# Patient Record
Sex: FEMALE | Race: ASIAN | URBAN - METROPOLITAN AREA
[De-identification: names, ages, dates, MRNs, and addresses within clinical notes are randomized per-mention and may not be internally consistent; named-entity substitution may affect disease eponyms.]

---

## 2017-03-29 ENCOUNTER — TRANSFERRED RECORDS (OUTPATIENT)
Dept: HEALTH INFORMATION MANAGEMENT | Facility: CLINIC | Age: 27
End: 2017-03-29

## 2017-03-29 LAB
HPV ABSTRACT: NORMAL
PAP-ABSTRACT: ABNORMAL

## 2017-07-11 ENCOUNTER — PRENATAL OFFICE VISIT (OUTPATIENT)
Dept: NURSING | Facility: CLINIC | Age: 27
End: 2017-07-11
Payer: COMMERCIAL

## 2017-07-11 VITALS
DIASTOLIC BLOOD PRESSURE: 79 MMHG | WEIGHT: 170 LBS | TEMPERATURE: 98.7 F | HEART RATE: 91 BPM | HEIGHT: 66 IN | SYSTOLIC BLOOD PRESSURE: 123 MMHG | BODY MASS INDEX: 27.32 KG/M2

## 2017-07-11 DIAGNOSIS — Z34.00 SUPERVISION OF NORMAL FIRST PREGNANCY: Primary | ICD-10-CM

## 2017-07-11 PROBLEM — Z23 NEED FOR TDAP VACCINATION: Status: ACTIVE | Noted: 2017-07-11

## 2017-07-11 LAB
ABO + RH BLD: NORMAL
ABO + RH BLD: NORMAL
BLD GP AB SCN SERPL QL: NORMAL
BLOOD BANK CMNT PATIENT-IMP: NORMAL
ERYTHROCYTE [DISTWIDTH] IN BLOOD BY AUTOMATED COUNT: 13.9 % (ref 10–15)
HCT VFR BLD AUTO: 42.6 % (ref 35–47)
HGB BLD-MCNC: 14.2 G/DL (ref 11.7–15.7)
MCH RBC QN AUTO: 30.8 PG (ref 26.5–33)
MCHC RBC AUTO-ENTMCNC: 33.3 G/DL (ref 31.5–36.5)
MCV RBC AUTO: 92 FL (ref 78–100)
PLATELET # BLD AUTO: 209 10E9/L (ref 150–450)
RBC # BLD AUTO: 4.61 10E12/L (ref 3.8–5.2)
SPECIMEN EXP DATE BLD: NORMAL
WBC # BLD AUTO: 9.2 10E9/L (ref 4–11)

## 2017-07-11 PROCEDURE — 99207 ZZC NO CHARGE NURSE ONLY: CPT

## 2017-07-11 PROCEDURE — 86780 TREPONEMA PALLIDUM: CPT | Performed by: OBSTETRICS & GYNECOLOGY

## 2017-07-11 PROCEDURE — 85027 COMPLETE CBC AUTOMATED: CPT | Performed by: OBSTETRICS & GYNECOLOGY

## 2017-07-11 PROCEDURE — 86901 BLOOD TYPING SEROLOGIC RH(D): CPT | Performed by: OBSTETRICS & GYNECOLOGY

## 2017-07-11 PROCEDURE — 99000 SPECIMEN HANDLING OFFICE-LAB: CPT | Performed by: OBSTETRICS & GYNECOLOGY

## 2017-07-11 PROCEDURE — 36415 COLL VENOUS BLD VENIPUNCTURE: CPT | Performed by: OBSTETRICS & GYNECOLOGY

## 2017-07-11 PROCEDURE — 86900 BLOOD TYPING SEROLOGIC ABO: CPT | Performed by: OBSTETRICS & GYNECOLOGY

## 2017-07-11 PROCEDURE — 83021 HEMOGLOBIN CHROMOTOGRAPHY: CPT | Mod: 90 | Performed by: OBSTETRICS & GYNECOLOGY

## 2017-07-11 PROCEDURE — 86762 RUBELLA ANTIBODY: CPT | Performed by: OBSTETRICS & GYNECOLOGY

## 2017-07-11 PROCEDURE — 87389 HIV-1 AG W/HIV-1&-2 AB AG IA: CPT | Performed by: OBSTETRICS & GYNECOLOGY

## 2017-07-11 PROCEDURE — 87340 HEPATITIS B SURFACE AG IA: CPT | Performed by: OBSTETRICS & GYNECOLOGY

## 2017-07-11 PROCEDURE — 86850 RBC ANTIBODY SCREEN: CPT | Performed by: OBSTETRICS & GYNECOLOGY

## 2017-07-11 RX ORDER — PRENATAL VIT/IRON FUM/FOLIC AC 27MG-0.8MG
1 TABLET ORAL DAILY
Qty: 100 TABLET | Refills: 3 | Status: SHIPPED | OUTPATIENT
Start: 2017-07-11

## 2017-07-11 NOTE — NURSING NOTE
"Chief Complaint   Patient presents with     Prenatal Care     new  teaching and labs       Initial /79  Pulse 91  Temp 98.7  F (37.1  C)  Ht 5' 6\" (1.676 m)  Wt 170 lb (77.1 kg)  LMP 05/30/2017  BMI 27.44 kg/m2 Estimated body mass index is 27.44 kg/(m^2) as calculated from the following:    Height as of this encounter: 5' 6\" (1.676 m).    Weight as of this encounter: 170 lb (77.1 kg).  Medication Reconciliation: complete    "

## 2017-07-11 NOTE — MR AVS SNAPSHOT
After Visit Summary   7/11/2017    Evangelina Fletcher    MRN: [id number]           Patient Information     Date Of Birth          1990        Visit Information        Provider Department      7/11/2017 1:30 PM MULTILINGUAL WORD; RD OB NURSE EDUCATION [facility]        Today's Diagnoses     Supervision of normal first pregnancy    -  1       Follow-ups after your visit        Your next 10 appointments already scheduled     Aug 16, 2017  3:30 PM CDT   New Prenatal with Casi Dowling MD   [facility] ([facility]    [address] [place] [id number] [phone]              Who to contact     If you have questions or need follow up information about today's clinic visit or your schedule please contact [facility] [place] at [phone].  Normal or non-critical lab and imaging results will be communicated to you by MyChart, letter or phone within 4 business days after the clinic has received the results. If you do not hear from us within 7 days, please contact the clinic through [employer] or phone. If you have a critical or abnormal lab result, we will notify you by phone as soon as possible.  Submit refill requests through [employer] or call your pharmacy and they will forward the refill request to us. Please allow 3 business days for your refill to be completed.          Additional Information About Your Visit        MyChart Information     [employer] gives you secure access to your electronic health record. If you see a primary care provider, you can also send messages to your care team and make appointments. If you have questions, please call your primary care clinic.  If you do not have a primary care provider, please call [phone] and they will assist you.        Care EveryWhere ID     This is your Care EveryWhere ID. This could be used by other organizations to access your [facility]  "records  [id number]        Your Vitals Were     Pulse Temperature Height Last Period BMI (Body Mass Index)       91 98.7  F (37.1  C) 5' 6\" (1.676 m) 05/30/2017 27.44 kg/m2        Blood Pressure from Last 3 Encounters:   07/11/17 123/79    Weight from Last 3 Encounters:   07/11/17 170 lb (77.1 kg)              We Performed the Following     ABO/RH Type and Screen     Anti Treponema     CBC with Platelets     Hepatitis B surface antigen     HGB Eval Reflex to ELP or RBC Solubility     HIV Antigen Antibody Combo     Rubella Antibody IgG Quantitative     UA without Microscopic     Urine Culture Aerobic Bacterial          Today's Medication Changes          These changes are accurate as of: 7/11/17  2:28 PM.  If you have any questions, ask your nurse or doctor.               Start taking these medicines.        Dose/Directions    prenatal multivitamin  plus iron 27-0.8 MG Tabs per tablet   Used for:  Supervision of normal first pregnancy        Dose:  1 tablet   Take 1 tablet by mouth daily   Quantity:  100 tablet   Refills:  3            Where to get your medicines      These medications were sent to [facility] [place], [place] - [address] [facility]  [address], [place] [address]     Phone:  [phone]     prenatal multivitamin  plus iron 27-0.8 MG Tabs per tablet                Primary Care Provider    None Specified       No primary provider on file.        Equal Access to Services     CRISTIANE TAYLOR AH: Rg Bee, waaxda luqadaha, qaybta kaalmada adeegyada, angela zepeda. So [facility] [phone].    ATENCIÓN: Si habla español, tiene a ludwig disposición servicios gratuitos de asistencia lingüística. Llame al [phone].    We comply with applicable federal civil rights laws and [place] laws. We do not discriminate on the basis of race, color, national origin, age, disability sex, sexual orientation or gender identity.            Thank you!     Thank you for " choosing [facility]  for your care. Our goal is always to provide you with excellent care. Hearing back from our patients is one way we can continue to improve our services. Please take a few minutes to complete the written survey that you may receive in the mail after your visit with us. Thank you!             Your Updated Medication List - Protect others around you: Learn how to safely use, store and throw away your medicines at www.disposemymeds.org.          This list is accurate as of: 7/11/17  2:28 PM.  Always use your most recent med list.                   Brand Name Dispense Instructions for use Diagnosis    prenatal multivitamin  plus iron 27-0.8 MG Tabs per tablet     100 tablet    Take 1 tablet by mouth daily    Supervision of normal first pregnancy

## 2017-07-11 NOTE — PROGRESS NOTES
Patient presents for new ob teaching and labs, first pregnancy. Discussed first trimester screening. Handouts reviewed and given. Denies any problems at this time. Has appointment with Dr Dowling 8/16/17    Caffeine intake/servings daily - 1 or 2 tea  Calcium intake/servings daily - 3  Exercise 0 times weekly - describe ; encouraged walking  Sunscreen used - Yes  Seatbelts used - Yes  Guns stored in the home - No  Self Breast Exam - No  Pap test up to date -  Yes  Eye exam up to date -  Yes  Dental exam up to date -  No  Immunizations reviewed and up to date - Yes  Abuse: Current or Past (Physical, Sexual or Emotional) - No  Do you feel safe in your environment - Yes  Do you cope well with stress - Yes  Do you suffer from insomnia - No    Prenatal OB Questionnaire  Past Medical History  Diabetes   No  Hypertension   No  Heart Disease, mitral valve prolapse, or rheumatic fever?   No  An autoimmune disorder such as Lupus or Rheumatoid Arthritis?   No  Kidney Disease or Urinary Tract Infection?   No  Epilepsy, seizures or spells?   No  Migraine headaches?   No  A stroke or loss of function or sensation?   No  Any other neurological problems?   No  Have you ever been treated for depression?  No  Are you having problems with crying spells or loss of self-esteem?   No  Have you ever required psychiatric care?   No  Have you ever hepatitis, liver disease or jaundice?   No  Have you ever been treated for blood clots in your veins, deep venous thrombosis, inflammation in the veins, thrombosis, phlebitis, pulmonary embolism or varicosities?   No  Have you had excessive bleeding after surgery or dental work?   No  Do you bleed more than other women after a cut or scratch?   No  Do you have a history of anemia?   No  Have you ever been treated for thyroid problems or taken thyroid medication?  No  Do you have any other endocrine problems?  No  Have you ever been in a major accident or suffered serious trauma?   No  Within  the last year, has anyone hit slapped, kicked or otherwise hurt you?  No  In the last year, has anyone forced you to have sex when you didn't want to?  No  Have you ever had a blood transfusion?   No  Would you refuse a blood transfusion if a doctor judged it to be medically necessary?   No  If you answered yes, would you rather die than have a blood transfusion?   No  If you answered yes, is this for Rastafarian reasons?   No  Does anyone in your home smoke?   No  Do you use tobacco products?  No  Do you drink beer, wine, hard liquor?  No  Do you use any of the following: marijuana, speed, cocaine, heroine, hallucinogens, or other drugs?  No  Is your blood type Rh negative?   No  Have you ever had abnormal antibodies in your blood?   No  Have you ever had asthma?   No  Have you ever had tuberculosis?   No  Do you have any allergies to drugs or over-the-counter medications?   No    Allergies as of 7/11/2017:    Allergies as of 07/11/2017     (No Known Allergies)       Do you have any breast problems?   No  Have you ever ?   No  Have you had any gynecological surgical procedures such as cervical conization, a LEEP procedure, laser treatment, cryosurgery of the cervix, or a dilation and curettage, etc?  No  Have you had any other surgical procedures?  No  Have you been hospitalized for a nonsurgical reason excluding normal delivery?   No  Have you ever had any anesthetic complications?   No  Have you ever had an abnormal pap smear?   No  Do you have a history of abnormalities of the uterus?   No  Did it take you more than one year to become pregnant?   No  Have you ever been evaluated or treated for infertility?   No  Is there a history of medical problems in your family, which you feel might adversely affect your health or pregnancy?   No  Do you have any other problems we have not asked you about which you feel may be important to this pregnancy?  No    Symptoms since Last Menstrual Period  Do you have any of  the following:    *abdominal pain  No  *blood in stool or urine  No  *chest pain  No  *shortness of breath  No  *coughing or vomiting up blood No  *heart racing or skipping beats  No  *nausea and vomiting  No  *pain with urination  No  *vaginal discharge or bleeding  No  Current medications are:  Current Outpatient Prescriptions   Medication Sig Dispense Refill     Prenatal Vit-Fe Fumarate-FA (PRENATAL MULTIVITAMIN  PLUS IRON) 27-0.8 MG TABS per tablet Take 1 tablet by mouth daily 100 tablet 3       Genetic Screening  At the time of birth, will you be 35 years old or older?  No  Has the patient, baby s father, or anyone in either family had:  Thalassemia (Italian, Greek, Mediterranean, or  background only) and an MCV result less than 80?  No  Neural tube defect such as meningomyelocele, spina bifida or anencephaly?  No  Congenital heart defect?  No  Down s syndrome?  No  Berry-Sach s disease (Confucianist, Cajun, Pashto-Somali)?  No  Sickle cell disease or trait (Tea)?  No  Hemophilia or other inherited problems of blood coagulation? No  Muscular dystrophy?  No  Cystic Fibrosis?  No  [place] s chorea?  No  Mental retardation/autism? No   If yes, was the person tested for fragile X?  No  Any other inherited genetic or chromosomal disorder?  No  Maternal metabolic disorder (e.g. insulin-dependent diabetes, PKU)? No  A child with birth defects not listed above?  No  Recurrent pregnancy loss or a stillbirth?  No  Has the patient had any medications/street drugs/alcohol since her last menstrual period? No  Does the patient or baby s father have any other genetic risks?  No  Infection History  Do you object to being tested for Hepatitis B? No  Do you object to being tested for HIV? No  Do you feel that you are at high risk for coming in contact with the AIDS virus?  No  Have you ever been treated for tuberculosis?  No  Have you ever received the BCG vaccine for tuberculosis?  No  Have you ever had a positive skin  test for tuberculosis? No  Do you live with someone who has tuberculosis?  No  Have you ever been exposed to tuberculosis?  No  Do you have genital herpes?  No  Does your partner have genital herpes?  No  Have you had a rash or viral illness since your last period?  No  Have you ever had Gonorrhea, Chlamydia, Syphilis, venereal warts, trichomoniasis, pelvic inflammatory disease or any other sexually transmitted disease?  No  Do you know if you are a genital group B streptococcus carrier? No  You have not had chicken pox/varicella  Yes  Have you been vaccinated against chicken pox?  No  Have you had any other infectious disease? No        Early ultrasound screening tool:    Does patient have irregular periods?  No  Did patient use hormonal birth control in the three months prior to positive urine pregnancy test? No  Is the patient breastfeeding?  No  Is the patient 10 weeks or greater at time of education visit?  No

## 2017-07-12 LAB
HBV SURFACE AG SERPL QL IA: NONREACTIVE
HIV 1+2 AB+HIV1 P24 AG SERPL QL IA: NORMAL
RUBV IGG SERPL IA-ACNC: 126 IU/ML
T PALLIDUM IGG+IGM SER QL: NEGATIVE

## 2017-07-14 LAB
HGB A1 MFR BLD: 96.1 %
HGB A2 MFR BLD: 3.1 %
HGB C MFR BLD: 0 %
HGB E MFR BLD: 0 %
HGB F MFR BLD: 0.8 %
HGB FRACT BLD ELPH-IMP: NORMAL
HGB OTHER MFR BLD: 0 %
HGB S BLD QL SOLY: NORMAL
HGB S MFR BLD: 0 %
PATH INTERP BLD-IMP: NORMAL

## 2017-08-16 ENCOUNTER — PRENATAL OFFICE VISIT (OUTPATIENT)
Dept: OBGYN | Facility: CLINIC | Age: 27
End: 2017-08-16
Payer: COMMERCIAL

## 2017-08-16 VITALS
TEMPERATURE: 97.4 F | BODY MASS INDEX: 28.03 KG/M2 | OXYGEN SATURATION: 100 % | WEIGHT: 174.4 LBS | HEART RATE: 93 BPM | HEIGHT: 66 IN | DIASTOLIC BLOOD PRESSURE: 86 MMHG | SYSTOLIC BLOOD PRESSURE: 122 MMHG

## 2017-08-16 DIAGNOSIS — Z34.01 NORMAL FIRST PREGNANCY CONFIRMED, CURRENTLY IN FIRST TRIMESTER: Primary | ICD-10-CM

## 2017-08-16 DIAGNOSIS — B37.31 YEAST INFECTION OF THE VAGINA: ICD-10-CM

## 2017-08-16 LAB
SPECIMEN SOURCE: ABNORMAL
WET PREP SPEC: ABNORMAL

## 2017-08-16 PROCEDURE — 99207 ZZC FIRST OB VISIT: CPT | Performed by: OBSTETRICS & GYNECOLOGY

## 2017-08-16 PROCEDURE — 87210 SMEAR WET MOUNT SALINE/INK: CPT | Performed by: OBSTETRICS & GYNECOLOGY

## 2017-08-16 NOTE — MR AVS SNAPSHOT
After Visit Summary   8/16/2017    Henrik Fletcher    MRN: [id number]           Patient Information     Date Of Birth          1990        Visit Information        Provider Department      8/16/2017 3:15 PM Casi Dowling MD; MULTILINGUAL WORD [facility]        Today's Diagnoses     Normal first pregnancy confirmed, currently in first trimester    -  1    Yeast infection of the vagina           Follow-ups after your visit        Your next 10 appointments already scheduled     Sep 13, 2017  4:45 PM CDT   ESTABLISHED PRENATAL with Casi Dowling MD   [facility] ([facility])    [address] [place] [id number] [phone]              Who to contact     If you have questions or need follow up information about today's clinic visit or your schedule please contact [facility] directly at [phone].  Normal or non-critical lab and imaging results will be communicated to you by MyChart, letter or phone within 4 business days after the clinic has received the results. If you do not hear from us within 7 days, please contact the clinic through [employer] or phone. If you have a critical or abnormal lab result, we will notify you by phone as soon as possible.  Submit refill requests through [employer] or call your pharmacy and they will forward the refill request to us. Please allow 3 business days for your refill to be completed.          Additional Information About Your Visit        MyChart Information     [employer] gives you secure access to your electronic health record. If you see a primary care provider, you can also send messages to your care team and make appointments. If you have questions, please call your primary care clinic.  If you do not have a primary care provider, please call [phone] and they will assist you.        Care EveryWhere ID     This is your Care  "EveryWhere ID. This could be used by other organizations to access your [facility] medical records  [id number]        Your Vitals Were     Pulse Temperature Height Last Period Pulse Oximetry BMI (Body Mass Index)    93 97.4  F (36.3  C) (Oral) 5' 6\" (1.676 m) 05/30/2017 100% 28.15 kg/m2       Blood Pressure from Last 3 Encounters:   08/16/17 122/86   07/11/17 123/79    Weight from Last 3 Encounters:   08/16/17 174 lb 6.4 oz (79.1 kg)   07/11/17 170 lb (77.1 kg)              We Performed the Following     Wet prep          Today's Medication Changes          These changes are accurate as of: 8/16/17  7:12 PM.  If you have any questions, ask your nurse or doctor.               Start taking these medicines.        Dose/Directions    miconazole nitrate 4 % Crea   Commonly known as:  MONISTAT 3   Used for:  Normal first pregnancy confirmed, currently in first trimester, Yeast infection of the vagina   Started by:  Casi Dowling MD        Dose:  1 Applicatorful   Place 1 Applicatorful vaginally At Bedtime for 3 days   Quantity:  25 g   Refills:  1            Where to get your medicines      These medications were sent to [facility] [place], [place] - [address] [facility]  [address], [place] [address]     Phone:  [phone]     miconazole nitrate 4 % Crea                Primary Care Provider    None Specified       No primary provider on file.        Equal Access to Services     [facility] Hospital[facility]AS[facility] AH: Hadii rustam Bee, waaxda luqadaha, qaybta kaalmada noahyada, angela zepeda. So [facility] [phone].    ATENCIÓN: Si habla español, tiene a ludwig disposición servicios gratuitos de asistencia lingüística. Jaret al [phone].    We comply with applicable federal civil rights laws and [place] laws. We do not discriminate on the basis of race, color, national origin, age, disability sex, sexual orientation or gender identity.            Thank you!     Thank you for choosing " [facility]  for your care. Our goal is always to provide you with excellent care. Hearing back from our patients is one way we can continue to improve our services. Please take a few minutes to complete the written survey that you may receive in the mail after your visit with us. Thank you!             Your Updated Medication List - Protect others around you: Learn how to safely use, store and throw away your medicines at www.disposemymeds.org.          This list is accurate as of: 8/16/17  7:12 PM.  Always use your most recent med list.                   Brand Name Dispense Instructions for use Diagnosis    miconazole nitrate 4 % Crea    MONISTAT 3    25 g    Place 1 Applicatorful vaginally At Bedtime for 3 days    Normal first pregnancy confirmed, currently in first trimester, Yeast infection of the vagina       prenatal multivitamin plus iron 27-0.8 MG Tabs per tablet     100 tablet    Take 1 tablet by mouth daily    Supervision of normal first pregnancy

## 2017-08-16 NOTE — PROGRESS NOTES
"Chief Complaint   Patient presents with     Prenatal Care     11+1.       Initial /77 (BP Location: Left arm, Patient Position: Sitting, Cuff Size: Adult Regular)  Pulse 93  Temp 97.4  F (36.3  C) (Oral)  Ht 5' 6\" (1.676 m)  Wt 174 lb 6.4 oz (79.1 kg)  LMP 2017  SpO2 100%  BMI 28.15 kg/m2 Estimated body mass index is 28.15 kg/(m^2) as calculated from the following:    Height as of this encounter: 5' 6\" (1.676 m).    Weight as of this encounter: 174 lb 6.4 oz (79.1 kg).  BP completed using cuff size: regular        The following HM Due: NONE      The following patient reported/Care Every where data was sent to:  P ABSTRACT QUALITY INITIATIVES [68410]  Pap smear done on this date: 3/29/2017 (approximately), by this group: [facility], results were NIL, and HPV negative.      n/a and patient has appointment for today             "

## 2017-08-16 NOTE — Clinical Note
Pap smear done on this date: 3/29/2017 (approximately), by this group: [facility], results were NIL, and HPV negative.

## 2017-08-16 NOTE — PROGRESS NOTES
"SUBJECTIVE:     Henrik Fletcher \"Evangelina\"  is a  27 year old female   @ 11w1d  by LMP who presents for a new Ob visit.  Here with her  and her interpretor.  She is from [place].  Has been here for 2-3 yrs now.  Came here to be with her .  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 She does understand English pretty well.   symptoms of pregnancy include very hungry and nauseated if she does not eat.  Has gained ~ 4 lbs so far.   No bleeding or constipation.   No US so far.        She had a pap smear in 2017 at [facility].   It was ASCUS but negative HPV.  This is in care everywhere.     Patient Active Problem List   Diagnosis     Need for Tdap vaccination       Past Medical History:   Diagnosis Date     NO ACTIVE PROBLEMS        Past Surgical History:   Procedure Laterality Date     NO HISTORY OF SURGERY          Current Outpatient Prescriptions   Medication     Prenatal Vit-Fe Fumarate-FA (PRENATAL MULTIVITAMIN  PLUS IRON) 27-0.8 MG TABS per tablet     No current facility-administered medications for this visit.        No Known Allergies      EXAM:  /86 (BP Location: Right arm, Patient Position: Sitting, Cuff Size: Adult Regular)  Pulse 93  Temp 97.4  F (36.3  C) (Oral)  Ht 5' 6\" (1.676 m)  Wt 174 lb 6.4 oz (79.1 kg)  LMP 2017  SpO2 100%  BMI 28.15 kg/m2     GENERAL: WDWN Nepali F in NAD  HEENT: no abnormalities  NECK: without thyromegaly or adenopathy  BACK: without CVAT or paraspinal tenderness  CHEST: clear to auscultation  CV: RRR without murmur  BREASTS: no palpable masses or adenopathy, no asymmetry or skin dimpling  ABDOMEN: S, NT, no palpable masses or hepatosplenomegaly  MUSCULOSKELETAL: no obvious abnormalities.  NEUROLOGICAL: normal strength, sensation, mental status  PSYCH: normal affect, appropriate  PELVIC: EG - normal adult female.  BUS - within normal limits.  Vagina - well rugated, erythematous with thick white chunky discharge.  Cervix - " no lesions, no CMT. Patient did not tolerate the BME well at all so uterine size hard to estimate.  Uterus -  nontender.  Adnexae - no masses or tenderness.  RV - deferred.      BEDSIDE U/S: done to confirm dates and viability.     ABD US unable to see CRL clearly so transvaginal US done which showed             single live IUP, normal  gestational sac,  normal cardiac activity and fetal movement,  CRL= 10w4d       ASSESSMENT/ PLAN:  IUP @ 11w1d c/w bedside US today    Encounter Diagnoses   Name Primary?     Normal first pregnancy confirmed, currently in first trimester      Yeast infection of the vagina         Discussed routine prenatal care and first trimester screen testing.    She will check to see if her insurance will cover  the first trimester screen.  She does want it, if it is covered.     Patient was counselled on healthy lifestyle habits and all questions answered.    New Ob labs reviewed today.    Return to Clinic in 4 weeks or sooner if problems arise.    Casi Dowling MD

## 2017-08-18 DIAGNOSIS — Z34.90 SUPERVISION OF NORMAL PREGNANCY: Primary | ICD-10-CM

## 2017-08-28 ENCOUNTER — PRE VISIT (OUTPATIENT)
Dept: MATERNAL FETAL MEDICINE | Facility: CLINIC | Age: 27
End: 2017-08-28

## 2017-08-30 ENCOUNTER — OFFICE VISIT (OUTPATIENT)
Dept: MATERNAL FETAL MEDICINE | Facility: CLINIC | Age: 27
End: 2017-08-30
Attending: OBSTETRICS & GYNECOLOGY
Payer: COMMERCIAL

## 2017-08-30 ENCOUNTER — HOSPITAL ENCOUNTER (OUTPATIENT)
Dept: ULTRASOUND IMAGING | Facility: CLINIC | Age: 27
Discharge: HOME | End: 2017-08-30
Attending: OBSTETRICS & GYNECOLOGY | Admitting: OBSTETRICS & GYNECOLOGY
Payer: COMMERCIAL

## 2017-08-30 DIAGNOSIS — O26.90 PREGNANCY RELATED CONDITION, UNSPECIFIED TRIMESTER: ICD-10-CM

## 2017-08-30 DIAGNOSIS — Z36.9 FIRST TRIMESTER SCREENING: Primary | ICD-10-CM

## 2017-08-30 PROCEDURE — 76813 OB US NUCHAL MEAS 1 GEST: CPT

## 2017-08-30 PROCEDURE — 96040 ZZH GENETIC COUNSELING, EACH 30 MINUTES: CPT | Mod: ZF | Performed by: GENETIC COUNSELOR, MS

## 2017-08-30 PROCEDURE — 36415 COLL VENOUS BLD VENIPUNCTURE: CPT | Performed by: OBSTETRICS & GYNECOLOGY

## 2017-08-30 PROCEDURE — 84704 HCG FREE BETACHAIN TEST: CPT | Performed by: OBSTETRICS & GYNECOLOGY

## 2017-08-30 PROCEDURE — 84163 PAPPA SERUM: CPT | Performed by: OBSTETRICS & GYNECOLOGY

## 2017-08-30 NOTE — MR AVS SNAPSHOT
After Visit Summary   8/30/2017    Henrik Fletcher    MRN: [id number]           Patient Information     Date Of Birth          1990        Visit Information        Provider Department      8/30/2017 11:00 AM Polly Luo [facility] Maternal Fetal Medicine [facility]        Today's Diagnoses     First trimester screening    -  1    Pregnancy related condition, unspecified trimester           Follow-ups after your visit        Your next 10 appointments already scheduled     Sep 13, 2017  4:30 PM CDT   ESTABLISHED PRENATAL with Casi Dowling MD   [facility] ([facility])    [address] [place] [id number] [phone]              Who to contact     If you have questions or need follow up information about today's clinic visit or your schedule please contact [facility] directly at [phone].  Normal or non-critical lab and imaging results will be communicated to you by MyChart, letter or phone within 4 business days after the clinic has received the results. If you do not hear from us within 7 days, please contact the clinic through Brightstormhart or phone. If you have a critical or abnormal lab result, we will notify you by phone as soon as possible.  Submit refill requests through Digital Signal or call your pharmacy and they will forward the refill request to us. Please allow 3 business days for your refill to be completed.          Additional Information About Your Visit        Brightstormhart Information     Digital Signal gives you secure access to your electronic health record. If you see a primary care provider, you can also send messages to your care team and make appointments. If you have questions, please call your primary care clinic.  If you do not have a primary care provider, please call [phone] and they will assist you.        Care EveryWhere ID     This is your Care EveryWhere  ID. This could be used by other organizations to access your [place] medical records  [id number]        Your Vitals Were     Last Period                   05/30/2017            Blood Pressure from Last 3 Encounters:   08/16/17 122/86   07/11/17 123/79    Weight from Last 3 Encounters:   08/16/17 79.1 kg (174 lb 6.4 oz)   07/11/17 77.1 kg (170 lb)              We Performed the Following     First Trimester Screen Biochem Markers     MFM Genetic Counseling        Primary Care Provider    None Specified       No primary provider on file.        Equal Access to Services     CRISTIANE TAYLOR : Hadii rustam fuo Soandres, waaxda luqadaha, qaybta kaalmagato ayon, angela ha . So [facility] [phone].    ATENCIÓN: Si habla español, tiene a ludwig disposición servicios gratuitos de asistencia lingüística. Llame al [phone].    We comply with applicable federal civil rights laws and [place] laws. We do not discriminate on the basis of race, color, national origin, age, disability sex, sexual orientation or gender identity.            Thank you!     Thank you for choosing [facility]  for your care. Our goal is always to provide you with excellent care. Hearing back from our patients is one way we can continue to improve our services. Please take a few minutes to complete the written survey that you may receive in the mail after your visit with us. Thank you!             Your Updated Medication List - Protect others around you: Learn how to safely use, store and throw away your medicines at www.disposemymeds.org.          This list is accurate as of: 8/30/17 12:29 PM.  Always use your most recent med list.                   Brand Name Dispense Instructions for use Diagnosis    prenatal multivitamin plus iron 27-0.8 MG Tabs per tablet     100 tablet    Take 1 tablet by mouth daily    Supervision of normal first pregnancy

## 2017-08-30 NOTE — PROGRESS NOTES
"[facility] Maternal Fetal Medicine [place]  Genetic Counseling Consult    Patient: Henrik Fletcher YOB: 1990   Date of Service: 17      Henrik Fletcher was seen at [facility] for genetic consultation to discuss the options for routine screening for fetal chromosome abnormalities. A [facility]  was present for our discussion.       Impression/Plan:   1.  Henrik \"Evangelina\" had an ultrasound and blood draw for first trimester screening.  Results are expected within 5 days, and will be available in [place].  We will contact her to discuss the results, and a copy will be forwarded to the office of the referring OB provider. Henrik requested a detailed message with results with a [facility]  on her voicemail ([phone]).     2. Maternal serum AFP (single marker screen) is recommended after 15 weeks to screen for open neural tube defects. A quad screen should not be performed.    Pregnancy History:   /Parity:    Age at Delivery: 28 year old  MICHOACANO: 3/6/2018, by Last Menstrual Period  Gestational Age: 13w1d    No significant complications or exposures were reported in the current pregnancy.    Medical History:   Henrik bazan reported medical history is not expected to impact pregnancy management or risks to fetal development.       Family History:   A three-generation pedigree was obtained, and is scanned under the  Media  tab.   The reported family history is negative for multiple miscarriages, stillbirths, birth defects, mental retardation, known genetic conditions, and consanguinity.       Carrier Screening:   The patient reports that she and the father of the pregnancy have  ancestry:      The hemoglobinopathies are a group of genetic blood diseases that occur with increased frequency in individuals of  ancestry and carrier screening for these conditions is available.  " Carrier screening for the hemoglobinopathies includes a CBC with red blood cell indices, a ferritin level, and a quantitative hemoglobin electrophoresis or HPLC.  In addition,  screening in the [facility] includes many of the hemoglobinopathies.    Evangelina had a normal hemoglobin electrophoresis and MCV which significantly decreases her risk to be a hemoglobinopathy carrier.       Expanded carrier screening for mutations in a large panel of genes associated with autosomal recessive conditions including cystic fibrosis, spinal muscular atrophy, and others, is now available.       Risk Assessment for Chromosome Conditions:   We explained that the risk for fetal chromosome abnormalities increases with maternal age. We discussed specific features of common chromosome abnormalities, including Down syndrome, trisomy 13, trisomy 18, and sex chromosome trisomies.      - At age 28 at delivery, the midtrimester risk to have a baby with Down syndrome is 1 in 760.     - At age 28 at delivery, the midtrimester  risk to have a baby with any chromosome abnormality is 1 in 380.          Testing Options:   We discussed the following options:   First trimester screening    First trimester ultrasound with nuchal translucency and nasal bone assessments, maternal plasma hCG, SAYRA-A, and AFP measurement    Screens for fetal trisomy 21, trisomy 13, and trisomy 18    Cannot screen for open neural tube defects; maternal serum AFP after 15 weeks is recommended     Non-invasive Prenatal Testing (NIPT)    Maternal plasma cell-free DNA testing; first trimester ultrasound with nuchal translucency and nasal bone assessment is recommended, when appropriate    Screens for fetal trisomy 21, trisomy 13, trisomy 18, and sex chromosome aneuploidy    Cannot screen for open neural tube defects; maternal serum AFP after 15 weeks is recommended     Genetic Amniocentesis    Invasive procedure typically performed in the second trimester by  which amniotic fluid is obtained for the purpose of chromosome analysis and/or other prenatal genetic analysis    Diagnostic results; >99% sensitivity for fetal chromosome abnormalities    AFAFP measurement tests for open neural tube defects        We reviewed the benefits and limitations of this testing.  Screening tests provide a risk assessment specific to the pregnancy for certain fetal chromosome abnormalities, but cannot definitively diagnose or exclude a fetal chromosome abnormality.  Follow-up genetic counseling and consideration of diagnostic testing is recommended with any abnormal screening result.      It was a pleasure to be [facility]. Face-to-face time of the meeting was 25 minutes.    Polly Luo, [facility]  Certified Genetic Counselor  VM: [phone]

## 2017-08-30 NOTE — MR AVS SNAPSHOT
After Visit Summary   8/30/2017    Henrik Fletcher    MRN: [id number]           Patient Information     Date Of Birth          1990        Visit Information        Provider Department      8/30/2017 12:15 PM Margaret Stoll,  [facility] Maternal Fetal Medicine [facility]        Today's Diagnoses     First trimester screening    -  1       Follow-ups after your visit        Your next 10 appointments already scheduled     Sep 13, 2017  4:30 PM CDT   ESTABLISHED PRENATAL with Casi Dowling MD   [facility] [place] ([facility] [place]    [address] [place] [id number] [phone]              Who to contact     If you have questions or need follow up information about today's clinic visit or your schedule please contact [facility] directly at [phone].  Normal or non-critical lab and imaging results will be communicated to you by Showbuckshart, letter or phone within 4 business days after the clinic has received the results. If you do not hear from us within 7 days, please contact the clinic through Showbuckshart or phone. If you have a critical or abnormal lab result, we will notify you by phone as soon as possible.  Submit refill requests through Servhawk or call your pharmacy and they will forward the refill request to us. Please allow 3 business days for your refill to be completed.          Additional Information About Your Visit        MyChart Information     Servhawk gives you secure access to your electronic health record. If you see a primary care provider, you can also send messages to your care team and make appointments. If you have questions, please call your primary care clinic.  If you do not have a primary care provider, please call [phone] and they will assist you.        Care EveryWhere ID     This is your Care EveryWhere ID. This could be used by other organizations to access  your [place] medical records  [id number]        Your Vitals Were     Last Period                   05/30/2017            Blood Pressure from Last 3 Encounters:   08/16/17 122/86   07/11/17 123/79    Weight from Last 3 Encounters:   08/16/17 79.1 kg (174 lb 6.4 oz)   07/11/17 77.1 kg (170 lb)              Today, you had the following     No orders found for display       Primary Care Provider    None Specified       No primary provider on file.        Equal Access to Services     CRISTIANE TAYLOR : Hadii aad ku hadasho Soomaali, waaxda luqadaha, qaybta kaalmada adeegyada, waxay chastityin renée ha . So [facility] [phone].    ATENCIÓN: Si habla español, tiene a ludwig disposición servicios gratuitos de asistencia lingüística. Llame al [phone].    We comply with applicable federal civil rights laws and [place] laws. We do not discriminate on the basis of race, color, national origin, age, disability sex, sexual orientation or gender identity.            Thank you!     Thank you for choosing [facility]  for your care. Our goal is always to provide you with excellent care. Hearing back from our patients is one way we can continue to improve our services. Please take a few minutes to complete the written survey that you may receive in the mail after your visit with us. Thank you!             Your Updated Medication List - Protect others around you: Learn how to safely use, store and throw away your medicines at www.disposemymeds.org.          This list is accurate as of: 8/30/17  4:46 PM.  Always use your most recent med list.                   Brand Name Dispense Instructions for use Diagnosis    prenatal multivitamin plus iron 27-0.8 MG Tabs per tablet     100 tablet    Take 1 tablet by mouth daily    Supervision of normal first pregnancy

## 2017-08-30 NOTE — PROGRESS NOTES
"Please see \"Imaging\" tab under \"Chart Review\" for details of today's US.    Margaret Stoll, DO    "

## 2017-09-05 ENCOUNTER — TELEPHONE (OUTPATIENT)
Dept: MATERNAL FETAL MEDICINE | Facility: CLINIC | Age: 27
End: 2017-09-05

## 2017-09-05 LAB — LAB SCANNED RESULT: NORMAL

## 2017-09-05 NOTE — TELEPHONE ENCOUNTER
"Spoke with Henrik (\"Evangelina) regarding her first trimester screening results which reduced her risk for Down syndrome from 1:751 to 1:>10,000 and the risk for trisomy 13/18 from 1:1355 to 1:>10,000.   A [employer]  was used for this call.    Polly Luo MS, [facility]  Certified Genetic Counselor          "

## 2017-09-13 ENCOUNTER — PRENATAL OFFICE VISIT (OUTPATIENT)
Dept: OBGYN | Facility: CLINIC | Age: 27
End: 2017-09-13
Payer: COMMERCIAL

## 2017-09-13 VITALS
DIASTOLIC BLOOD PRESSURE: 76 MMHG | TEMPERATURE: 99.1 F | SYSTOLIC BLOOD PRESSURE: 114 MMHG | BODY MASS INDEX: 28.12 KG/M2 | WEIGHT: 175 LBS | HEIGHT: 66 IN | OXYGEN SATURATION: 100 % | HEART RATE: 78 BPM

## 2017-09-13 DIAGNOSIS — N89.8 VAGINAL DISCHARGE: Primary | ICD-10-CM

## 2017-09-13 DIAGNOSIS — N76.0 BV (BACTERIAL VAGINOSIS): ICD-10-CM

## 2017-09-13 DIAGNOSIS — Z34.02 NORMAL FIRST PREGNANCY CONFIRMED, CURRENTLY IN SECOND TRIMESTER: ICD-10-CM

## 2017-09-13 DIAGNOSIS — B96.89 BV (BACTERIAL VAGINOSIS): ICD-10-CM

## 2017-09-13 DIAGNOSIS — B37.31 YEAST INFECTION OF THE VAGINA: ICD-10-CM

## 2017-09-13 LAB
SPECIMEN SOURCE: ABNORMAL
WET PREP SPEC: ABNORMAL

## 2017-09-13 PROCEDURE — 87210 SMEAR WET MOUNT SALINE/INK: CPT | Performed by: OBSTETRICS & GYNECOLOGY

## 2017-09-13 PROCEDURE — 99207 ZZC PRENATAL VISIT: CPT | Performed by: OBSTETRICS & GYNECOLOGY

## 2017-09-13 RX ORDER — FLUCONAZOLE 150 MG/1
150 TABLET ORAL ONCE
Qty: 1 TABLET | Refills: 0 | Status: SHIPPED | OUTPATIENT
Start: 2017-09-13 | End: 2017-09-13

## 2017-09-13 RX ORDER — METRONIDAZOLE 500 MG/1
500 TABLET ORAL 2 TIMES DAILY
Qty: 14 TABLET | Refills: 0 | Status: SHIPPED | OUTPATIENT
Start: 2017-09-13 | End: 2017-10-16

## 2017-09-13 RX ORDER — MICONAZOLE NITRATE 2 %
1 CREAM WITH APPLICATOR VAGINAL AT BEDTIME
Qty: 70 G | Refills: 0 | Status: SHIPPED | OUTPATIENT
Start: 2017-09-13 | End: 2017-09-20

## 2017-09-13 ASSESSMENT — PATIENT HEALTH QUESTIONNAIRE - PHQ9: SUM OF ALL RESPONSES TO PHQ QUESTIONS 1-9: 2

## 2017-09-13 NOTE — MR AVS SNAPSHOT
After Visit Summary   9/13/2017    Henrik Fletcher    MRN: [id number]           Patient Information     Date Of Birth          1990        Visit Information        Provider Department      9/13/2017 4:30 PM Casi Dowling MD; [employer] [employer] [facility]        Today's Diagnoses     Vaginal discharge    -  1    BV (bacterial vaginosis)        Yeast infection of the vagina        Normal first pregnancy confirmed, currently in second trimester           Follow-ups after your visit        Who to contact     If you have questions or need follow up information about today's clinic visit or your schedule please contact [facility] directly at [phone].  Normal or non-critical lab and imaging results will be communicated to you by MyChart, letter or phone within 4 business days after the clinic has received the results. If you do not hear from us within 7 days, please contact the clinic through Lapiohart or phone. If you have a critical or abnormal lab result, we will notify you by phone as soon as possible.  Submit refill requests through Backup Circle or call your pharmacy and they will forward the refill request to us. Please allow 3 business days for your refill to be completed.          Additional Information About Your Visit        MyChart Information     Backup Circle gives you secure access to your electronic health record. If you see a primary care provider, you can also send messages to your care team and make appointments. If you have questions, please call your primary care clinic.  If you do not have a primary care provider, please call [phone] and they will assist you.        Care EveryWhere ID     This is your Care EveryWhere ID. This could be used by other organizations to access your [place] medical records  [id number]        Your Vitals Were     Pulse Temperature Height Last Period Pulse Oximetry BMI (Body Mass Index)     "78 99.1  F (37.3  C) (Oral) 5' 6\" (1.676 m) 05/30/2017 100% 28.25 kg/m2       Blood Pressure from Last 3 Encounters:   09/13/17 114/76   08/16/17 122/86   07/11/17 123/79    Weight from Last 3 Encounters:   09/13/17 175 lb (79.4 kg)   08/16/17 174 lb 6.4 oz (79.1 kg)   07/11/17 170 lb (77.1 kg)              We Performed the Following     Wet prep          Today's Medication Changes          These changes are accurate as of: 9/13/17 11:59 PM.  If you have any questions, ask your nurse or doctor.               Start taking these medicines.        Dose/Directions    fluconazole 150 MG tablet   Commonly known as:  DIFLUCAN   Used for:  Yeast infection of the vagina   Started by:  Casi Dowling MD        Dose:  150 mg   Take 1 tablet (150 mg) by mouth once for 1 dose   Quantity:  1 tablet   Refills:  0       metroNIDAZOLE 500 MG tablet   Commonly known as:  FLAGYL   Used for:  BV (bacterial vaginosis)   Started by:  Casi Dowling MD        Dose:  500 mg   Take 1 tablet (500 mg) by mouth 2 times daily   Quantity:  14 tablet   Refills:  0       miconazole 2 % cream   Commonly known as:  MICATIN   Used for:  Yeast infection of the vagina   Started by:  Casi Dowling MD        Dose:  1 applicator   Place 1 applicator vaginally At Bedtime for 7 days   Quantity:  70 g   Refills:  0            Where to get your medicines      These medications were sent to [facility] [place] [place] - [address] [facility]  [address], [place] [address]     Phone:  [phone]     fluconazole 150 MG tablet    metroNIDAZOLE 500 MG tablet    miconazole 2 % cream                Primary Care Provider    None Specified       No primary provider on file.        Equal Access to Services     VINCE TAYLOR : Rg Bee, marquis isbell, angela markham. Enedina [facility] [phone].    ATENCIÓN: Si habla español, tiene a ludwig disposición servicios " yaritza de asistencia lingüística. Jaret nuñez [phone].    We comply with applicable federal civil rights laws and [place] laws. We do not discriminate on the basis of race, color, national origin, age, disability sex, sexual orientation or gender identity.            Thank you!     Thank you for choosing [facility]  for your care. Our goal is always to provide you with excellent care. Hearing back from our patients is one way we can continue to improve our services. Please take a few minutes to complete the written survey that you may receive in the mail after your visit with us. Thank you!             Your Updated Medication List - Protect others around you: Learn how to safely use, store and throw away your medicines at www.disposemymeds.org.          This list is accurate as of: 9/13/17 11:59 PM.  Always use your most recent med list.                   Brand Name Dispense Instructions for use Diagnosis    fluconazole 150 MG tablet    DIFLUCAN    1 tablet    Take 1 tablet (150 mg) by mouth once for 1 dose    Yeast infection of the vagina       metroNIDAZOLE 500 MG tablet    FLAGYL    14 tablet    Take 1 tablet (500 mg) by mouth 2 times daily    BV (bacterial vaginosis)       miconazole 2 % cream    MICATIN    70 g    Place 1 applicator vaginally At Bedtime for 7 days    Yeast infection of the vagina       prenatal multivitamin plus iron 27-0.8 MG Tabs per tablet     100 tablet    Take 1 tablet by mouth daily    Supervision of normal first pregnancy

## 2017-09-13 NOTE — PROGRESS NOTES
15w1d  No complaints.  Had a normal first trimester screen. No movement felt yet.   Feeling well.  Was treated for yeast vaginitis at NOB visit.  Still with intermittent symptoms of vaginal irritation and itching.   Pelvic:  EG - normal adult female.  BUS - within normal limits.  Vagina - well rugated, chunky yellow discharge.  Cervix -  no CMT.    Wet prep + yeast and clue  A/P: persistent bv and yeast  rx for metronidazole and diflucan.    discussed AFP test and they will check with insurance and call back if insurance will cover.  ANIKET

## 2017-09-14 PROBLEM — Z34.02 NORMAL FIRST PREGNANCY CONFIRMED, CURRENTLY IN SECOND TRIMESTER: Status: ACTIVE | Noted: 2017-09-14

## 2017-09-26 DIAGNOSIS — Z34.02 NORMAL FIRST PREGNANCY CONFIRMED, CURRENTLY IN SECOND TRIMESTER: ICD-10-CM

## 2017-09-26 PROCEDURE — 82105 ALPHA-FETOPROTEIN SERUM: CPT | Mod: 90 | Performed by: OBSTETRICS & GYNECOLOGY

## 2017-09-26 PROCEDURE — 36415 COLL VENOUS BLD VENIPUNCTURE: CPT | Performed by: OBSTETRICS & GYNECOLOGY

## 2017-09-26 PROCEDURE — 99000 SPECIMEN HANDLING OFFICE-LAB: CPT | Performed by: OBSTETRICS & GYNECOLOGY

## 2017-09-29 LAB
# FETUSES US: NORMAL
AFP ADJ MOM AMN: 0.85
AFP SERPL-MCNC: 29 NG/ML
AGE - REPORTED: 29.1 YR
DATING METHOD: NORMAL
DIABETIC AT CONCEPTION: NO
FAMILY MEMBER DISEASES HX: NO
FAMILY MEMBER DISEASES HX: NO
GA METHOD: NORMAL
GA: 17 WEEKS
HX OF HEREDITARY DISORDERS: NO
IDDM PATIENT QL: NO
INTEGRATED SCN PATIENT-IMP: NORMAL
LMP START DATE: NORMAL
PREV HX CHROMOSOME ABNORMALITY: NO
SPECIMEN DRAWN SERPL: NORMAL
TWINS: NORMAL

## 2017-10-16 ENCOUNTER — RADIANT APPOINTMENT (OUTPATIENT)
Dept: ULTRASOUND IMAGING | Facility: CLINIC | Age: 27
End: 2017-10-16
Attending: OBSTETRICS & GYNECOLOGY
Payer: COMMERCIAL

## 2017-10-16 ENCOUNTER — PRENATAL OFFICE VISIT (OUTPATIENT)
Dept: OBGYN | Facility: CLINIC | Age: 27
End: 2017-10-16
Payer: COMMERCIAL

## 2017-10-16 VITALS
SYSTOLIC BLOOD PRESSURE: 119 MMHG | WEIGHT: 176.6 LBS | HEIGHT: 66 IN | TEMPERATURE: 98.5 F | DIASTOLIC BLOOD PRESSURE: 70 MMHG | BODY MASS INDEX: 28.38 KG/M2 | OXYGEN SATURATION: 98 % | HEART RATE: 87 BPM

## 2017-10-16 DIAGNOSIS — Z34.02 NORMAL FIRST PREGNANCY CONFIRMED, CURRENTLY IN SECOND TRIMESTER: ICD-10-CM

## 2017-10-16 DIAGNOSIS — Z34.02 NORMAL FIRST PREGNANCY CONFIRMED, CURRENTLY IN SECOND TRIMESTER: Primary | ICD-10-CM

## 2017-10-16 PROCEDURE — 76805 OB US >/= 14 WKS SNGL FETUS: CPT | Performed by: OBSTETRICS & GYNECOLOGY

## 2017-10-16 PROCEDURE — 99207 ZZC PRENATAL VISIT: CPT | Performed by: OBSTETRICS & GYNECOLOGY

## 2017-10-16 NOTE — MR AVS SNAPSHOT
After Visit Summary   10/16/2017    Henrik Fletcher    MRN: [id number]           Patient Information     Date Of Birth          1990        Visit Information        Provider Department      10/16/2017 4:00 PM Casi Dowling MD; [employer] [employer] [facility]        Today's Diagnoses     Normal first pregnancy confirmed, currently in second trimester    -  1       Follow-ups after your visit        Your next 10 appointments already scheduled     Nov 20, 2017  4:00 PM CST   LAB with RD LAB   [facility] ([facility])    [address] [place] [id number] [phone]           Patient must bring picture ID. Patient should be prepared to give a urine specimen  Please do not eat 10-12 hours before your appointment if you are coming in fasting for labs on lipids, cholesterol, or glucose (sugar). Pregnant women should follow their Care Team instructions. Water with medications is okay. Do not drink coffee or other fluids. If you have concerns about taking  your medications, please ask at office or if scheduling via [place] [employer], send a message by clicking on Secure Messaging, Message Your Care Team.            Nov 20, 2017  4:45 PM CST   ESTABLISHED PRENATAL with Casi Dowling MD   [facility] ([facility] [place] [facility])    [address] [place] [id number] [phone]              Future tests that were ordered for you today     Open Future Orders        Priority Expected Expires Ordered    Glucose tolerance gest screen 1 hour Routine  1/14/2018 10/16/2017    OB hemoglobin Routine  1/14/2018 10/16/2017    Anti Treponema Routine  2/13/2018 10/16/2017            Who to contact     If you have questions or need follow up information about today's clinic visit or your schedule please contact [facility] directly at [phone].  Normal  "or non-critical lab and imaging results will be communicated to you by Dealflow.comhart, letter or phone within 4 business days after the clinic has received the results. If you do not hear from us within 7 days, please contact the clinic through Stackpop or phone. If you have a critical or abnormal lab result, we will notify you by phone as soon as possible.  Submit refill requests through Stackpop or call your pharmacy and they will forward the refill request to us. Please allow 3 business days for your refill to be completed.          Additional Information About Your Visit        Stackpop Information     Stackpop gives you secure access to your electronic health record. If you see a primary care provider, you can also send messages to your care team and make appointments. If you have questions, please call your primary care clinic.  If you do not have a primary care provider, please call [phone] and they will assist you.        Care EveryWhere ID     This is your Care EveryWhere ID. This could be used by other organizations to access your [place] medical records  [id number]        Your Vitals Were     Pulse Temperature Height Last Period Pulse Oximetry BMI (Body Mass Index)    87 98.5  F (36.9  C) (Oral) 5' 6\" (1.676 m) 05/30/2017 98% 28.5 kg/m2       Blood Pressure from Last 3 Encounters:   10/16/17 119/70   09/13/17 114/76   08/16/17 122/86    Weight from Last 3 Encounters:   10/16/17 176 lb 9.6 oz (80.1 kg)   09/13/17 175 lb (79.4 kg)   08/16/17 174 lb 6.4 oz (79.1 kg)                 Today's Medication Changes          These changes are accurate as of: 10/16/17  6:53 PM.  If you have any questions, ask your nurse or doctor.               Stop taking these medicines if you haven't already. Please contact your care team if you have questions.     metroNIDAZOLE 500 MG tablet   Commonly known as:  FLAGYL   Stopped by:  Casi Dowling MD                    Primary Care Provider    None Specified       No " primary provider on file.        Equal Access to Services     [facility] CLAUDIA : Hadii rustam Bee, wamónica isbell, qaangela croft. So [facility] [phone].    ATENCIÓN: Si habla español, tiene a ludwig disposición servicios gratuitos de asistencia lingüística. Llame al [phone].    We comply with applicable federal civil rights laws and [place] laws. We do not discriminate on the basis of race, color, national origin, age, disability, sex, sexual orientation, or gender identity.            Thank you!     Thank you for choosing [facility] [place] [facility]  for your care. Our goal is always to provide you with excellent care. Hearing back from our patients is one way we can continue to improve our services. Please take a few minutes to complete the written survey that you may receive in the mail after your visit with us. Thank you!             Your Updated Medication List - Protect others around you: Learn how to safely use, store and throw away your medicines at www.disposemymeds.org.          This list is accurate as of: 10/16/17  6:53 PM.  Always use your most recent med list.                   Brand Name Dispense Instructions for use Diagnosis    prenatal multivitamin plus iron 27-0.8 MG Tabs per tablet     100 tablet    Take 1 tablet by mouth daily    Supervision of normal first pregnancy

## 2017-10-16 NOTE — PROGRESS NOTES
19w6d   No complaints.  + FM. Normal AFP test.   Vaginal symptoms improved but wants to be checked again to make sure.   Has trouble swallowing her prenatal vitamins.  discussed options for her.   US survey done today.  Looks like low lying placenta.  discussed repeat US in couple months. Normal pelvic exam today without vaginal d/c. ANIKET

## 2017-10-25 ENCOUNTER — TELEPHONE (OUTPATIENT)
Dept: OBGYN | Facility: CLINIC | Age: 27
End: 2017-10-25

## 2017-10-25 NOTE — TELEPHONE ENCOUNTER
"Pt's  calling in with pt in the room, stating that the pt is having pain in her lower abdomen that comes and goes. The pt states that she had the pains this morning and then they stopped and now they have started again. They have not timed the pains. Pt states that they feel like a menstrual cramp. Pt denies bleeding, unusual discharge or leaking of watery fluids. Pt states she has not drank water today, \"very little.\" Pt informed to drink water, take tylenol 650 mg every 6 hours as needed for discomfort, to use a warm pack/soak in warm bath or shower and to call back if the pains do not go away, they get worse and more consistent, she starts to have bleeding/discharge or leaking of watery fluid. Pt aware and agreeable to this plan.  informed of what number to call and that there is someone that answers after hours and can connect them with a nurse if needed. Wendy Ratliff RN    "

## 2017-11-20 ENCOUNTER — PRENATAL OFFICE VISIT (OUTPATIENT)
Dept: OBGYN | Facility: CLINIC | Age: 27
End: 2017-11-20
Payer: COMMERCIAL

## 2017-11-20 VITALS
OXYGEN SATURATION: 100 % | TEMPERATURE: 97 F | BODY MASS INDEX: 28.87 KG/M2 | SYSTOLIC BLOOD PRESSURE: 113 MMHG | HEART RATE: 69 BPM | HEIGHT: 66 IN | WEIGHT: 179.6 LBS | DIASTOLIC BLOOD PRESSURE: 57 MMHG

## 2017-11-20 DIAGNOSIS — Z34.02 NORMAL FIRST PREGNANCY CONFIRMED, CURRENTLY IN SECOND TRIMESTER: ICD-10-CM

## 2017-11-20 LAB
GLUCOSE 1H P 50 G GLC PO SERPL-MCNC: 66 MG/DL (ref 60–129)
HGB BLD-MCNC: 12.4 G/DL (ref 11.7–15.7)

## 2017-11-20 PROCEDURE — 82306 VITAMIN D 25 HYDROXY: CPT | Performed by: OBSTETRICS & GYNECOLOGY

## 2017-11-20 PROCEDURE — 36415 COLL VENOUS BLD VENIPUNCTURE: CPT | Performed by: OBSTETRICS & GYNECOLOGY

## 2017-11-20 PROCEDURE — 99207 ZZC PRENATAL VISIT: CPT | Performed by: OBSTETRICS & GYNECOLOGY

## 2017-11-20 PROCEDURE — 82950 GLUCOSE TEST: CPT | Performed by: OBSTETRICS & GYNECOLOGY

## 2017-11-20 PROCEDURE — 00000218 ZZHCL STATISTIC OBHBG - HEMOGLOBIN: Performed by: OBSTETRICS & GYNECOLOGY

## 2017-11-20 PROCEDURE — 86780 TREPONEMA PALLIDUM: CPT | Performed by: OBSTETRICS & GYNECOLOGY

## 2017-11-20 ASSESSMENT — PATIENT HEALTH QUESTIONNAIRE - PHQ9: SUM OF ALL RESPONSES TO PHQ QUESTIONS 1-9: 0

## 2017-11-20 NOTE — MR AVS SNAPSHOT
"              After Visit Summary   11/20/2017    Henrik Fletcher    MRN: [id number]           Patient Information     Date Of Birth          1990        Visit Information        Provider Department      11/20/2017 4:30 PM Casi Dowling MD; [employer] [facility]        Today's Diagnoses     Normal first pregnancy confirmed, currently in second trimester           Follow-ups after your visit        Who to contact     If you have questions or need follow up information about today's clinic visit or your schedule please contact [facility] directly at [phone].  Normal or non-critical lab and imaging results will be communicated to you by MyChart, letter or phone within 4 business days after the clinic has received the results. If you do not hear from us within 7 days, please contact the clinic through Women of Coffeehart or phone. If you have a critical or abnormal lab result, we will notify you by phone as soon as possible.  Submit refill requests through Compass Engine or call your pharmacy and they will forward the refill request to us. Please allow 3 business days for your refill to be completed.          Additional Information About Your Visit        MyChart Information     Compass Engine gives you secure access to your electronic health record. If you see a primary care provider, you can also send messages to your care team and make appointments. If you have questions, please call your primary care clinic.  If you do not have a primary care provider, please call [phone] and they will assist you.        Care EveryWhere ID     This is your Care EveryWhere ID. This could be used by other organizations to access your [place] medical records  [id number]        Your Vitals Were     Pulse Temperature Height Last Period Pulse Oximetry BMI (Body Mass Index)    69 97  F (36.1  C) (Oral) 5' 6\" (1.676 m) 05/30/2017 100% 28.99 kg/m2       Blood Pressure from " Last 3 Encounters:   11/20/17 113/57   10/16/17 119/70   09/13/17 114/76    Weight from Last 3 Encounters:   11/20/17 179 lb 9.6 oz (81.5 kg)   10/16/17 176 lb 9.6 oz (80.1 kg)   09/13/17 175 lb (79.4 kg)              We Performed the Following     Anti Treponema     Glucose tolerance gest screen 1 hour     OB hemoglobin     Vitamin D Deficiency        Primary Care Provider    Physician No Ref-Primary       NO REF-PRIMARY PHYSICIAN        Equal Access to Services     CRISTIANE TAYLOR : Hadii aad ku hadasho Soomaali, waaxda luqadaha, qaybta kaalmada adeporfirioyagato, angela zepeda. So [facility] [phone].    ATENCIÓN: Si habla español, tiene a ludwig disposición servicios gratuitos de asistencia lingüística. Llame al [phone].    We comply with applicable federal civil rights laws and [place] laws. We do not discriminate on the basis of race, color, national origin, age, disability, sex, sexual orientation, or gender identity.            Thank you!     Thank you for choosing [facility]  for your care. Our goal is always to provide you with excellent care. Hearing back from our patients is one way we can continue to improve our services. Please take a few minutes to complete the written survey that you may receive in the mail after your visit with us. Thank you!             Your Updated Medication List - Protect others around you: Learn how to safely use, store and throw away your medicines at www.disposemymeds.org.          This list is accurate as of: 11/20/17  7:36 PM.  Always use your most recent med list.                   Brand Name Dispense Instructions for use Diagnosis    prenatal multivitamin plus iron 27-0.8 MG Tabs per tablet     100 tablet    Take 1 tablet by mouth daily    Supervision of normal first pregnancy

## 2017-11-21 NOTE — PROGRESS NOTES
Prenatal Breastfeeding Education Toolkit provided for patient to review, helping her to make an informed decision on a feeding choice for her baby. Content of toolkit  Includes: benefits of breastfeeding, importance of skin to skin and rooming in.  Questions directed to her provider.        Hand outs given       Pro's and Con's of circumcision             Pre-registration Form         Labs due today:    Glucose testing   OB hemoglobin  Anti treponema    Antibody screen, if RH negative.

## 2017-11-21 NOTE — PROGRESS NOTES
24w6d   Here with  and interpretor. Patient reports some days she does not feel baby kick.   We reviewed fetal movements and how to do kick counts.  Patient reassured.  Heart beat was fine today.   We will do another US in about a month to follow up on low placenta.  discussed back pain in pregnancy.  She might try a support belt.   GCT and hgb today.  ANIKET

## 2017-11-22 LAB
DEPRECATED CALCIDIOL+CALCIFEROL SERPL-MC: 21 UG/L (ref 20–75)
T PALLIDUM IGG+IGM SER QL: NEGATIVE

## 2017-12-11 ENCOUNTER — PRENATAL OFFICE VISIT (OUTPATIENT)
Dept: OBGYN | Facility: CLINIC | Age: 27
End: 2017-12-11
Payer: COMMERCIAL

## 2017-12-11 VITALS
OXYGEN SATURATION: 99 % | WEIGHT: 182.3 LBS | HEART RATE: 83 BPM | SYSTOLIC BLOOD PRESSURE: 132 MMHG | DIASTOLIC BLOOD PRESSURE: 77 MMHG | TEMPERATURE: 98.3 F | BODY MASS INDEX: 29.3 KG/M2 | HEIGHT: 66 IN

## 2017-12-11 DIAGNOSIS — Z34.02 NORMAL FIRST PREGNANCY CONFIRMED, CURRENTLY IN SECOND TRIMESTER: Primary | ICD-10-CM

## 2017-12-11 PROCEDURE — 99207 ZZC PRENATAL VISIT: CPT | Performed by: OBSTETRICS & GYNECOLOGY

## 2017-12-11 NOTE — PROGRESS NOTES
27w6d  No complaints.  Normal FM.    Passed GCT.  Normal hgb and weight gain.  Taking vitamin D.  Has hospital tour tomorrow.   Questions about cord blood banking.  RENETTA's sister has a child with CP and he found out that at [facility], they are researching the use of stem cells for CP.   discussed cord blood banking is an option thru private companies.  ANIKET

## 2017-12-11 NOTE — MR AVS SNAPSHOT
After Visit Summary   12/11/2017    Henrik Fletcher    MRN: [id number]           Patient Information     Date Of Birth          1990        Visit Information        Provider Department      12/11/2017 5:00 PM Casi Dowling MD; [employer] [facility]        Today's Diagnoses     Normal first pregnancy confirmed, currently in second trimester    -  1       Follow-ups after your visit        Your next 10 appointments already scheduled     Dec 27, 2017  4:00 PM CST   ESTABLISHED PRENATAL with Casi Dowling MD   [facility] [place]    [address] [place] [id number] [phone]            Ke 15, 2018 10:30 AM CST   ESTABLISHED PRENATAL with Casi Dowling MD   [facility] [place]    [address] [place] [id number] [phone]              Who to contact     If you have questions or need follow up information about today's clinic visit or your schedule please contact [facility] directly at [phone].  Normal or non-critical lab and imaging results will be communicated to you by MyChart, letter or phone within 4 business days after the clinic has received the results. If you do not hear from us within 7 days, please contact the clinic through Six Month Smileshart or phone. If you have a critical or abnormal lab result, we will notify you by phone as soon as possible.  Submit refill requests through Uniregistry or call your pharmacy and they will forward the refill request to us. Please allow 3 business days for your refill to be completed.          Additional Information About Your Visit        MyChart Information     Uniregistry gives you secure access to your electronic health record. If you see a primary care provider, you can also send messages to your care team and make appointments. If you  "have questions, please call your primary care clinic.  If you do not have a primary care provider, please call [phone] and they will assist you.        Care EveryWhere ID     This is your Care EveryWhere ID. This could be used by other organizations to access your [place] medical records  [id number]        Your Vitals Were     Pulse Temperature Height Last Period Pulse Oximetry BMI (Body Mass Index)    83 98.3  F (36.8  C) (Oral) 5' 6\" (1.676 m) 05/30/2017 99% 29.42 kg/m2       Blood Pressure from Last 3 Encounters:   12/11/17 132/77   11/20/17 113/57   10/16/17 119/70    Weight from Last 3 Encounters:   12/11/17 182 lb 4.8 oz (82.7 kg)   11/20/17 179 lb 9.6 oz (81.5 kg)   10/16/17 176 lb 9.6 oz (80.1 kg)              Today, you had the following     No orders found for display       Primary Care Provider Fax #    Physician No Ref-Primary [phone]       No address on file        Equal Access to Services     [facility]: Hadii rustam madrigal Soandres, waaxda luqadaha, qaybta kaalmada nany, angela ha . So [facility] [phone].    ATENCIÓN: Si habla español, tiene a ludwig disposición servicios gratuitos de asistencia lingüística. Jaret al [phone].    We comply with applicable federal civil rights laws and [place] laws. We do not discriminate on the basis of race, color, national origin, age, disability, sex, sexual orientation, or gender identity.            Thank you!     Thank you for choosing [facility]  for your care. Our goal is always to provide you with excellent care. Hearing back from our patients is one way we can continue to improve our services. Please take a few minutes to complete the written survey that you may receive in the mail after your visit with us. Thank you!             Your Updated Medication List - Protect others around you: Learn how to safely use, store and throw away your medicines at www.disposemymeds.org.          This " list is accurate as of: 12/11/17  6:01 PM.  Always use your most recent med list.                   Brand Name Dispense Instructions for use Diagnosis    prenatal multivitamin plus iron 27-0.8 MG Tabs per tablet     100 tablet    Take 1 tablet by mouth daily    Supervision of normal first pregnancy       VITAMIN D-3 PO      Take 2,000 Int'l Units by mouth

## 2017-12-27 ENCOUNTER — PRENATAL OFFICE VISIT (OUTPATIENT)
Dept: OBGYN | Facility: CLINIC | Age: 27
End: 2017-12-27
Payer: COMMERCIAL

## 2017-12-27 VITALS
HEART RATE: 77 BPM | BODY MASS INDEX: 30.29 KG/M2 | DIASTOLIC BLOOD PRESSURE: 61 MMHG | OXYGEN SATURATION: 99 % | SYSTOLIC BLOOD PRESSURE: 110 MMHG | HEIGHT: 66 IN | TEMPERATURE: 97.7 F | WEIGHT: 188.5 LBS

## 2017-12-27 DIAGNOSIS — M54.9 BACK PAIN AFFECTING PREGNANCY IN THIRD TRIMESTER: ICD-10-CM

## 2017-12-27 DIAGNOSIS — O44.40 LOW LYING PLACENTA, ANTEPARTUM: ICD-10-CM

## 2017-12-27 DIAGNOSIS — O99.891 BACK PAIN AFFECTING PREGNANCY IN THIRD TRIMESTER: ICD-10-CM

## 2017-12-27 DIAGNOSIS — Z34.03 NORMAL FIRST PREGNANCY IN THIRD TRIMESTER: ICD-10-CM

## 2017-12-27 DIAGNOSIS — Z23 NEED FOR TDAP VACCINATION: Primary | ICD-10-CM

## 2017-12-27 PROCEDURE — 90715 TDAP VACCINE 7 YRS/> IM: CPT | Performed by: OBSTETRICS & GYNECOLOGY

## 2017-12-27 PROCEDURE — 90471 IMMUNIZATION ADMIN: CPT | Performed by: OBSTETRICS & GYNECOLOGY

## 2017-12-27 PROCEDURE — 99207 ZZC PRENATAL VISIT: CPT | Performed by: OBSTETRICS & GYNECOLOGY

## 2017-12-27 NOTE — PROGRESS NOTES
30w1d  patient complains of bilateral low back pain over the para-spinous muscles.   discussed management.  DME for support belt given.   Baby is moving well and there is no cramping. Will get US for low lying placenta and growth at 32 wks. ANIKET

## 2017-12-27 NOTE — MR AVS SNAPSHOT
After Visit Summary   12/27/2017    Henrik Fletcher    MRN: [id number]           Patient Information     Date Of Birth          1990        Visit Information        Provider Department      12/27/2017 3:45 PM Casi Dowling MD; [employer] [facility]        Today's Diagnoses     Need for Tdap vaccination    -  1    Normal first pregnancy in third trimester        Back pain affecting pregnancy in third trimester        Low lying placenta, antepartum           Follow-ups after your visit        Your next 10 appointments already scheduled     Ke 15, 2018 10:15 AM CST   ESTABLISHED PRENATAL with Casi Dowling MD   [facility] ([facility])    [address] [place] [phone]              Future tests that were ordered for you today     Open Future Orders        Priority Expected Expires Ordered    US OB > 14 Weeks Complete Single Routine  12/27/2018 12/27/2017            Who to contact     If you have questions or need follow up information about today's clinic visit or your schedule please contact [facility] directly at [phone].  Normal or non-critical lab and imaging results will be communicated to you by MyChart, letter or phone within 4 business days after the clinic has received the results. If you do not hear from us within 7 days, please contact the clinic through MyChart or phone. If you have a critical or abnormal lab result, we will notify you by phone as soon as possible.  Submit refill requests through Pharmaxis or call your pharmacy and they will forward the refill request to us. Please allow 3 business days for your refill to be completed.          Additional Information About Your Visit        MyChart Information     Pharmaxis gives you secure access to your electronic health record. If you see a primary care provider, you can  "also send messages to your care team and make appointments. If you have questions, please call your primary care clinic.  If you do not have a primary care provider, please call [phone] and they will assist you.        Care EveryWhere ID     This is your Care EveryWhere ID. This could be used by other organizations to access your [place] medical records  [id number]        Your Vitals Were     Pulse Temperature Height Last Period Pulse Oximetry BMI (Body Mass Index)    77 97.7  F (36.5  C) (Oral) 5' 6\" (1.676 m) 05/30/2017 99% 30.42 kg/m2       Blood Pressure from Last 3 Encounters:   12/27/17 110/61   12/11/17 132/77   11/20/17 113/57    Weight from Last 3 Encounters:   12/27/17 188 lb 8 oz (85.5 kg)   12/11/17 182 lb 4.8 oz (82.7 kg)   11/20/17 179 lb 9.6 oz (81.5 kg)              We Performed the Following     ADMIN 1st VACCINE     TDAP VACCINE (ADACEL)          Today's Medication Changes          These changes are accurate as of: 12/27/17  6:29 PM.  If you have any questions, ask your nurse or doctor.               Start taking these medicines.        Dose/Directions    order for DME   Used for:  Back pain affecting pregnancy in third trimester   Started by:  Casi Dowling MD        Equipment being ordered: maternity support belt   Quantity:  1 Device   Refills:  0            Where to get your medicines      Some of these will need a paper prescription and others can be bought over the counter.  Ask your nurse if you have questions.     Bring a paper prescription for each of these medications     order for DME                Primary Care Provider Fax #    Physician No Ref-Primary [phone]       No address on file        Equal Access to Services     VINCE TAYLOR : marquis Styles, angela markham. So [facility] [phone].    ATENCIÓN: Si habla español, tiene a ludwig disposición servicios gratuitos de asistencia " lingüística. Jaret al [phone].    We comply with applicable federal civil rights laws and [place] laws. We do not discriminate on the basis of race, color, national origin, age, disability, sex, sexual orientation, or gender identity.            Thank you!     Thank you for choosing [facility]  for your [place]. Our goal is always to provide you with excellent care. Hearing back from our patients is one way we can continue to improve our services. Please take a few minutes to complete the written survey that you may receive in the mail after your visit with us. Thank you!             Your Updated Medication List - Protect others around you: Learn how to safely use, store and throw away your medicines at www.disposemymeds.org.          This list is accurate as of: 12/27/17  6:29 PM.  Always use your most recent med list.                   Brand Name Dispense Instructions for use Diagnosis    order for DME     1 Device    Equipment being ordered: maternity support belt    Back pain affecting pregnancy in third trimester       prenatal multivitamin plus iron 27-0.8 MG Tabs per tablet     100 tablet    Take 1 tablet by mouth daily    Supervision of normal first pregnancy       VITAMIN D-3 PO      Take 2,000 Int'l Units by mouth

## 2018-01-15 ENCOUNTER — PRENATAL OFFICE VISIT (OUTPATIENT)
Dept: OBGYN | Facility: CLINIC | Age: 28
End: 2018-01-15
Payer: COMMERCIAL

## 2018-01-15 ENCOUNTER — RADIANT APPOINTMENT (OUTPATIENT)
Dept: ULTRASOUND IMAGING | Facility: CLINIC | Age: 28
End: 2018-01-15
Attending: OBSTETRICS & GYNECOLOGY
Payer: COMMERCIAL

## 2018-01-15 VITALS
WEIGHT: 186.4 LBS | DIASTOLIC BLOOD PRESSURE: 80 MMHG | OXYGEN SATURATION: 99 % | HEART RATE: 82 BPM | BODY MASS INDEX: 29.96 KG/M2 | HEIGHT: 66 IN | TEMPERATURE: 98.4 F | SYSTOLIC BLOOD PRESSURE: 126 MMHG

## 2018-01-15 DIAGNOSIS — O44.40 LOW LYING PLACENTA, ANTEPARTUM: ICD-10-CM

## 2018-01-15 DIAGNOSIS — Z34.03 NORMAL FIRST PREGNANCY IN THIRD TRIMESTER: ICD-10-CM

## 2018-01-15 DIAGNOSIS — Z34.03 NORMAL FIRST PREGNANCY IN THIRD TRIMESTER: Primary | ICD-10-CM

## 2018-01-15 DIAGNOSIS — L98.9 SKIN LESION: ICD-10-CM

## 2018-01-15 PROBLEM — Z34.02 NORMAL FIRST PREGNANCY CONFIRMED, CURRENTLY IN SECOND TRIMESTER: Status: RESOLVED | Noted: 2017-09-14 | Resolved: 2018-01-15

## 2018-01-15 PROCEDURE — 76816 OB US FOLLOW-UP PER FETUS: CPT | Performed by: OBSTETRICS & GYNECOLOGY

## 2018-01-15 PROCEDURE — 99212 OFFICE O/P EST SF 10 MIN: CPT | Performed by: OBSTETRICS & GYNECOLOGY

## 2018-01-15 NOTE — MR AVS SNAPSHOT
After Visit Summary   1/15/2018    Henrik Fletcher    MRN: [id number]           Patient Information     Date Of Birth          1990        Visit Information        Provider Department      1/15/2018 4:30 PM Casi Dowling MD; [employer] [facility]        Today's Diagnoses     Normal first pregnancy in third trimester    -  1    Skin lesion           Follow-ups after your visit        Additional Services     DERMATOLOGY REFERRAL       Your provider has referred you to: [facility]: Dermatology Clinic [facility] [phone]   [email or web address]  N: [facility] [phone]   [email or web address]  N: [facility] [phone]   [email or web address]    Please be aware that coverage of these services is subject to the terms and limitations of your health insurance plan.  Call member services at your health plan with any benefit or coverage questions.      Please bring the following with you to your appointment:    (1) Any X-Rays, CTs or MRIs which have been performed.  Contact the facility where they were done to arrange for  prior to your scheduled appointment.    (2) List of current medications  (3) This referral request   (4) Any documents/labs given to you for this referral                  Your next 10 appointments already scheduled     Jan 29, 2018  4:30 PM CST   ESTABLISHED PRENATAL with Casi Dowling MD   [address] [id number] [phone]            Feb 06, 2018  4:15 PM CST   ESTABLISHED PRENATAL with Casi Dowling MD   [facility] ([facility])    [address] [place] [id number] [phone]            Feb 15, 2018  4:30 PM CST    ESTABLISHED PRENATAL with Casi Dowling MD   [facility] ([facility])    [address] [place] [address] [place]  [address]  [facility] [id number] [phone]            Feb 22, 2018  4:15 PM CST   ESTABLISHED PRENATAL with Casi Dowling MD   [facility] ([facility])    [address]  [address]  [facility] [id number] [phone]            Feb 26, 2018  4:30 PM CST   ESTABLISHED PRENATAL with Casi Dowling MD   [facility] ([facility])    [address] [place] [address] [place]  [address]  [facility] [phone]              Who to contact     If you have questions or need follow up information about today's clinic visit or your schedule please contact [facility] directly at [phone].  Normal or non-critical lab and imaging results will be communicated to you by HelloTelhart, letter or phone within 4 business days after the clinic has received the results. If you do not hear from us within 7 days, please contact the clinic through CoolCloudst or phone. If you have a critical or abnormal lab result, we will notify you by phone as soon as possible.  Submit refill requests through Hubba or call your pharmacy and they will forward the refill request to us. Please allow 3 business days for your refill to be completed.          Additional Information About Your Visit        HelloTelhart Information     Hubba gives you secure access to your electronic health record. If you see a primary care provider, you can also send messages to your care team and make appointments. If you have questions, please call your primary care clinic.  If you do not have a primary care provider, please call [phone] and they will assist you.        Care EveryWhere ID     This is your Care EveryWhere ID. This could be used by other organizations to access your [place] medical records  [id number]       "  Your Vitals Were     Pulse Temperature Height Last Period Pulse Oximetry BMI (Body Mass Index)    82 98.4  F (36.9  C) (Oral) 5' 6\" (1.676 m) 05/30/2017 99% 30.09 kg/m2       Blood Pressure from Last 3 Encounters:   01/15/18 126/80   12/27/17 110/61   12/11/17 132/77    Weight from Last 3 Encounters:   01/15/18 186 lb 6.4 oz (84.6 kg)   12/27/17 188 lb 8 oz (85.5 kg)   12/11/17 182 lb 4.8 oz (82.7 kg)              We Performed the Following     DERMATOLOGY REFERRAL        Primary Care Provider Fax #    Physician No Ref-Primary [phone]       No address on file        Equal Access to Services     CRISTIANE TAYLOR : Rg Bee, waaxda luqadaha, qaybta kaalmada nany, angela ha . So [facility] [phone].    ATENCIÓN: Si habla español, tiene a ludwig disposición servicios gratuitos de asistencia lingüística. Llame al [phone].    We comply with applicable federal civil rights laws and [place] laws. We do not discriminate on the basis of race, color, national origin, age, disability, sex, sexual orientation, or gender identity.            Thank you!     Thank you for choosing [facility]  for your care. Our goal is always to provide you with excellent care. Hearing back from our patients is one way we can continue to improve our services. Please take a few minutes to complete the written survey that you may receive in the mail after your visit with us. Thank you!             Your Updated Medication List - Protect others around you: Learn how to safely use, store and throw away your medicines at www.disposemymeds.org.          This list is accurate as of: 1/15/18  6:31 PM.  Always use your most recent med list.                   Brand Name Dispense Instructions for use Diagnosis    order for DME     1 Device    Equipment being ordered: maternity support belt    Back pain affecting pregnancy in third trimester       prenatal multivitamin plus iron 27-0.8 " MG Tabs per tablet     100 tablet    Take 1 tablet by mouth daily    Supervision of normal first pregnancy       VITAMIN D-3 PO      Take 2,000 Int'l Units by mouth

## 2018-01-16 NOTE — PROGRESS NOTES
32w6d  Feeling very well. Baby is moving.   US today shows low lying placenta resolved. Normal growth.   Patient complains of lesion on the left side of her neck,  Was there prior to pregnancy but has increased in size during pregnancy. Also a perioral rash below the lower lip which has been present for couple weeks.   On exam there is a circular rash on left side of neck no erythema but + discoloration. Below her lower lip there is a erythematous irritation along the full length of  Her lip.   discussed best to see dermatologist.  Might be tinea on her neck but derm can do testing and provide treatment for both issues.   Referral placed today.  ANIKET

## 2018-01-25 ENCOUNTER — TELEPHONE (OUTPATIENT)
Dept: DERMATOLOGY | Facility: CLINIC | Age: 28
End: 2018-01-25

## 2018-01-25 NOTE — TELEPHONE ENCOUNTER
Patient was called using  services and a general message was left to call dermatology as there was no identifier on message. This patient does not have established care with Dermatology and will need to be seen in UC if needing to be seen urgently or by OB as the message states she is pregnant.

## 2018-01-25 NOTE — TELEPHONE ENCOUNTER
----- Message from Isabela Armstrong sent at 1/25/2018  9:02 AM CST -----  Regarding: pt's , [facility], called--pt is very uncomfortable, she has spots on neck and..  Contact: [phone]  Mouth that are itchy and reddish-brown.  Pt is pregnant.  Pt would like to be seen asap.    Please call Edelmira at [phone] to schedule pt into clinic asap.    Thank you,  Issac ARDON    Please DO NOT send this message and/or reply back to sender.  Call Center Representatives DO NOT respond to messages.

## 2018-01-29 ENCOUNTER — PRENATAL OFFICE VISIT (OUTPATIENT)
Dept: OBGYN | Facility: CLINIC | Age: 28
End: 2018-01-29
Payer: COMMERCIAL

## 2018-01-29 VITALS
DIASTOLIC BLOOD PRESSURE: 77 MMHG | WEIGHT: 186.5 LBS | HEIGHT: 66 IN | TEMPERATURE: 97.8 F | OXYGEN SATURATION: 99 % | SYSTOLIC BLOOD PRESSURE: 129 MMHG | BODY MASS INDEX: 29.97 KG/M2 | HEART RATE: 82 BPM

## 2018-01-29 DIAGNOSIS — Z34.03 NORMAL FIRST PREGNANCY IN THIRD TRIMESTER: Primary | ICD-10-CM

## 2018-01-29 LAB
ALBUMIN UR-MCNC: NEGATIVE MG/DL
APPEARANCE UR: CLEAR
BILIRUB UR QL STRIP: NEGATIVE
COLOR UR AUTO: YELLOW
GLUCOSE UR STRIP-MCNC: NEGATIVE MG/DL
HGB UR QL STRIP: NEGATIVE
KETONES UR STRIP-MCNC: NEGATIVE MG/DL
LEUKOCYTE ESTERASE UR QL STRIP: NEGATIVE
NITRATE UR QL: NEGATIVE
PH UR STRIP: 7 PH (ref 5–7)
SOURCE: NORMAL
SP GR UR STRIP: 1.02 (ref 1–1.03)
UROBILINOGEN UR STRIP-ACNC: 0.2 EU/DL (ref 0.2–1)

## 2018-01-29 PROCEDURE — 87086 URINE CULTURE/COLONY COUNT: CPT | Performed by: OBSTETRICS & GYNECOLOGY

## 2018-01-29 PROCEDURE — 99207 ZZC PRENATAL VISIT: CPT | Performed by: OBSTETRICS & GYNECOLOGY

## 2018-01-29 PROCEDURE — 81003 URINALYSIS AUTO W/O SCOPE: CPT | Performed by: OBSTETRICS & GYNECOLOGY

## 2018-01-29 NOTE — MR AVS SNAPSHOT
After Visit Summary   1/29/2018    Henrik Fletcher    MRN: [id number]           Patient Information     Date Of Birth          1990        Visit Information        Provider Department      1/29/2018 4:15 PM Casi Dowling MD; [employer] [facility]        Today's Diagnoses     Normal first pregnancy in third trimester    -  1       Follow-ups after your visit        Your next 10 appointments already scheduled     Feb 06, 2018  4:00 PM CST   ESTABLISHED PRENATAL with Casi Dowling MD   [facility] ([facility]    [address] [place] [address] [address]  [facility] [address]   [phone]            Feb 15, 2018  4:15 PM CST   ESTABLISHED PRENATAL with Casi Dowling MD   [facility] ([facility]    [address] [place] [address] 700  [facility] [address] [phone]            Feb 22, 2018  4:00 PM CST   ESTABLISHED PRENATAL with Casi Dowling MD   [facility] ([facility]    [address] [place] [address] 700  [facility] [address] [phone]            Feb 26, 2018  4:15 PM CST   ESTABLISHED PRENATAL with Casi Dowling MD   [facility] ([facility]    [address] [place] [address] [address]  [facility] [address] [phone]              Who to contact     If you have questions or need follow up information about today's clinic visit or your schedule please contact [facility] directly at [phone].  Normal or non-critical lab and imaging results will be communicated to you by MyChart, letter or phone within 4 business days after the clinic has received the results. If you do not hear from us within 7 days, please contact the clinic through MyChart or phone. If you have a critical or abnormal lab result, we will notify you by phone as soon as  "possible.  Submit refill requests through Boxer or call your pharmacy and they will forward the refill request to us. Please allow 3 business days for your refill to be completed.          Additional Information About Your Visit        [facility] [facility] Information     Boxer gives you secure access to your electronic health record. If you see a primary care provider, you can also send messages to your care team and make appointments. If you have questions, please call your primary care clinic.  If you do not have a primary care provider, please call [phone] and they will assist you.        Care EveryWhere ID     This is your Care EveryWhere ID. This could be used by other organizations to access your [place] medical records  [id number]        Your Vitals Were     Pulse Temperature Height Last Period Pulse Oximetry Breastfeeding?    82 97.8  F (36.6  C) (Oral) 5' 6\" (1.676 m) 05/30/2017 (Exact Date) 99% No    BMI (Body Mass Index)                   30.1 kg/m2            Blood Pressure from Last 3 Encounters:   01/29/18 129/77   01/15/18 126/80   12/27/17 110/61    Weight from Last 3 Encounters:   01/29/18 186 lb 8 oz (84.6 kg)   01/15/18 186 lb 6.4 oz (84.6 kg)   12/27/17 188 lb 8 oz (85.5 kg)              We Performed the Following     *UA reflex to Microscopic     Urine Culture Aerobic Bacterial        Primary Care Provider Fax #    Physician No Ref-Primary [phone]       No address on file        Equal Access to Services     CRISTIANE TAYLOR : Hadii rustam ku tanko Soelizabethali, waaxda luqadaha, qaybta kaalmada adeegyada, angela ha . So [facility] [place] [phone].    ATENCIÓN: Si habla español, tiene a ludwig disposición servicios gratuitos de asistencia lingüística. Llame al [phone].    We comply with applicable federal civil rights laws and [place] laws. We do not discriminate on the basis of race, color, national origin, age, disability, sex, sexual orientation, or gender identity.       "      Thank you!     Thank you for choosing [facility]  for your care. Our goal is always to provide you with excellent care. Hearing back from our patients is one way we can continue to improve our services. Please take a few minutes to complete the written survey that you may receive in the mail after your visit with us. Thank you!             Your Updated Medication List - Protect others around you: Learn how to safely use, store and throw away your medicines at www.disposemymeds.org.          This list is accurate as of 1/29/18  5:11 PM.  Always use your most recent med list.                   Brand Name Dispense Instructions for use Diagnosis    order for DME     1 Device    Equipment being ordered: maternity support belt    Back pain affecting pregnancy in third trimester       prenatal multivitamin plus iron 27-0.8 MG Tabs per tablet     100 tablet    Take 1 tablet by mouth daily    Supervision of normal first pregnancy       VITAMIN D-3 PO      Take 2,000 Int'l Units by mouth

## 2018-01-29 NOTE — PROGRESS NOTES
34w6d.   No complaints.  Baby is moving well.  No contractions.  Childbirth classes? discussed   Plan on breastfeeding? Yes  Birthcontrol? Not sure  Gender on ultrasound? boy  Circumsion? Yes  Peds doc? Discussed considering [facility]    ANIKET

## 2018-01-30 LAB
BACTERIA SPEC CULT: NORMAL
SPECIMEN SOURCE: NORMAL

## 2018-01-31 ENCOUNTER — TRANSFERRED RECORDS (OUTPATIENT)
Dept: HEALTH INFORMATION MANAGEMENT | Facility: CLINIC | Age: 28
End: 2018-01-31

## 2018-02-06 ENCOUNTER — PRENATAL OFFICE VISIT (OUTPATIENT)
Dept: OBGYN | Facility: CLINIC | Age: 28
End: 2018-02-06
Payer: COMMERCIAL

## 2018-02-06 VITALS
SYSTOLIC BLOOD PRESSURE: 122 MMHG | BODY MASS INDEX: 30.36 KG/M2 | WEIGHT: 188.9 LBS | DIASTOLIC BLOOD PRESSURE: 76 MMHG | HEIGHT: 66 IN | HEART RATE: 74 BPM | TEMPERATURE: 97.8 F | OXYGEN SATURATION: 99 %

## 2018-02-06 DIAGNOSIS — Z34.03 NORMAL FIRST PREGNANCY IN THIRD TRIMESTER: Primary | ICD-10-CM

## 2018-02-06 DIAGNOSIS — O26.843 UTERINE SIZE-DATE DISCREPANCY IN THIRD TRIMESTER: ICD-10-CM

## 2018-02-06 PROCEDURE — 99207 ZZC PRENATAL VISIT: CPT | Performed by: OBSTETRICS & GYNECOLOGY

## 2018-02-06 PROCEDURE — 87653 STREP B DNA AMP PROBE: CPT | Performed by: OBSTETRICS & GYNECOLOGY

## 2018-02-06 NOTE — PROGRESS NOTES
36w0d  No complaints.  Was seen by derm and given lotion for her rash.  Improving now.   GBS and hgb today. Baby is moving well.   S<<D today so will get US.  Suspect baby has dropped some.      GBS:done today  Hemoglobin   Date Value Ref Range Status   11/20/2017 12.4 11.7 - 15.7 g/dL Final   ]    Breast pump rx: script done   Labor orders: yes  Birth plan: no   Length of stay: not discussed   Disability paperwork: NA  Resident involvement: discussed ok with only one resident, no students. does not want a crowd of people in her room.  It will  make her nervous.  discussed contraception and patient will use natural methods,  (withdrawal) declines anything else.   Casi Dowling MD

## 2018-02-06 NOTE — PROGRESS NOTES
Handouts given    Breastfeeding Recourse List  Rx or a Healthy Labor, Birth, and Recovery  Using Nitrous Oxide During Labor and Delivery   Labor and Birth Position Guide    Labs today    GBS, if not already positive  OB hemoglobin

## 2018-02-06 NOTE — MR AVS SNAPSHOT
After Visit Summary   2/6/2018    Henrik Fletcher    MRN: [id number]           Patient Information     Date Of Birth          1990        Visit Information        Provider Department      2/6/2018 4:00 PM Casi Dowling MD; [employer] [facility]        Today's Diagnoses     Normal first pregnancy in third trimester    -  1       Follow-ups after your visit        Your next 10 appointments already scheduled     Feb 15, 2018  4:15 PM CST   ESTABLISHED PRENATAL with Casi Dowling MD   [facility] ([facility])    [address] [place] [address] 700  [facility] [address] [phone]            Feb 22, 2018  4:00 PM CST   ESTABLISHED PRENATAL with Casi Dowling MD   [facility] ([facility])    [address] [place] [address] 700  [facility] [id number] [phone]            Feb 26, 2018  4:15 PM CST   ESTABLISHED PRENATAL with Casi Dowling MD   [facility] ([facility])    [address] [place] [id number] [phone]              Who to contact     If you have questions or need follow up information about today's clinic visit or your schedule please contact [facility] directly at [phone].  Normal or non-critical lab and imaging results will be communicated to you by MyChart, letter or phone within 4 business days after the clinic has received the results. If you do not hear from us within 7 days, please contact the clinic through MyChart or phone. If you have a critical or abnormal lab result, we will notify you by phone as soon as possible.  Submit refill requests through P2i or call your pharmacy and they will forward the refill request to us. Please allow 3 business days for your refill to be completed.          Additional Information About Your Visit       "  Synthelishart Information     GlocalReach gives you secure access to your electronic health record. If you see a primary care provider, you can also send messages to your care team and make appointments. If you have questions, please call your primary care clinic.  If you do not have a primary care provider, please call [phone] and they will assist you.        Care EveryWhere ID     This is your Care EveryWhere ID. This could be used by other organizations to access your [place] medical records  [id number]        Your Vitals Were     Pulse Temperature Height Last Period Pulse Oximetry BMI (Body Mass Index)    74 97.8  F (36.6  C) (Oral) 5' 6\" (1.676 m) 05/30/2017 (Exact Date) 99% 30.49 kg/m2       Blood Pressure from Last 3 Encounters:   02/06/18 122/76   01/29/18 129/77   01/15/18 126/80    Weight from Last 3 Encounters:   02/06/18 188 lb 14.4 oz (85.7 kg)   01/29/18 186 lb 8 oz (84.6 kg)   01/15/18 186 lb 6.4 oz (84.6 kg)              We Performed the Following     Group B strep PCR     OB hemoglobin          Today's Medication Changes          These changes are accurate as of 2/6/18  5:08 PM.  If you have any questions, ask your nurse or doctor.               These medicines have changed or have updated prescriptions.        Dose/Directions    * order for DME   This may have changed:  Another medication with the same name was added. Make sure you understand how and when to take each.   Used for:  Back pain affecting pregnancy in third trimester   Changed by:  Casi Dowling MD        Equipment being ordered: maternity support belt   Quantity:  1 Device   Refills:  0       * order for DME   This may have changed:  You were already taking a medication with the same name, and this prescription was added. Make sure you understand how and when to take each.   Used for:  Normal first pregnancy in third trimester   Changed by:  Casi Dowling MD        Equipment being ordered: double electric breast " pump   Quantity:  1 Device   Refills:  0       * Notice:  This list has 2 medication(s) that are the same as other medications prescribed for you. Read the directions carefully, and ask your doctor or other care provider to review them with you.         Where to get your medicines      Some of these will need a paper prescription and others can be bought over the counter.  Ask your nurse if you have questions.     Bring a paper prescription for each of these medications     order for DME                Primary Care Provider Fax #    Physician No Ref-Primary [phone]       No address on file        Equal Access to Services     CRISTIANE TAYLOR : Hadii rustam hoover hadasho Soomaali, waaxda luqadaha, qaybta kaalmada adeegyada, angela ha . So [facility] [phone].    ATENCIÓN: Si habla español, tiene a ludwig disposición servicios gratuitos de asistencia lingüística. Llame al [phone].    We comply with applicable federal civil rights laws and [place] laws. We do not discriminate on the basis of race, color, national origin, age, disability, sex, sexual orientation, or gender identity.            Thank you!     Thank you for choosing [facility]  for your care. Our goal is always to provide you with excellent care. Hearing back from our patients is one way we can continue to improve our services. Please take a few minutes to complete the written survey that you may receive in the mail after your visit with us. Thank you!             Your Updated Medication List - Protect others around you: Learn how to safely use, store and throw away your medicines at www.disposemymeds.org.          This list is accurate as of 2/6/18  5:08 PM.  Always use your most recent med list.                   Brand Name Dispense Instructions for use Diagnosis    HYDROCORTISONE PO           * order for DME     1 Device    Equipment being ordered: maternity support belt    Back pain affecting pregnancy in  third trimester       * order for DME     1 Device    Equipment being ordered: double electric breast pump    Normal first pregnancy in third trimester       prenatal multivitamin plus iron 27-0.8 MG Tabs per tablet     100 tablet    Take 1 tablet by mouth daily    Supervision of normal first pregnancy       VITAMIN D-3 PO      Take 2,000 Int'l Units by mouth        * Notice:  This list has 2 medication(s) that are the same as other medications prescribed for you. Read the directions carefully, and ask your doctor or other care provider to review them with you.

## 2018-02-07 ASSESSMENT — PATIENT HEALTH QUESTIONNAIRE - PHQ9: SUM OF ALL RESPONSES TO PHQ QUESTIONS 1-9: 0

## 2018-02-08 LAB
GP B STREP DNA SPEC QL NAA+PROBE: POSITIVE
SPECIMEN SOURCE: ABNORMAL

## 2018-02-09 RX ORDER — SODIUM CHLORIDE, SODIUM LACTATE, POTASSIUM CHLORIDE, CALCIUM CHLORIDE 600; 310; 30; 20 MG/100ML; MG/100ML; MG/100ML; MG/100ML
INJECTION, SOLUTION INTRAVENOUS CONTINUOUS
Status: CANCELLED | OUTPATIENT
Start: 2018-02-09

## 2018-02-09 RX ORDER — OXYTOCIN 10 [USP'U]/ML
10 INJECTION, SOLUTION INTRAMUSCULAR; INTRAVENOUS
Status: CANCELLED | OUTPATIENT
Start: 2018-02-09

## 2018-02-09 RX ORDER — OXYCODONE AND ACETAMINOPHEN 5; 325 MG/1; MG/1
1 TABLET ORAL
Status: CANCELLED | OUTPATIENT
Start: 2018-02-09

## 2018-02-09 RX ORDER — NALOXONE HYDROCHLORIDE 0.4 MG/ML
.1-.4 INJECTION, SOLUTION INTRAMUSCULAR; INTRAVENOUS; SUBCUTANEOUS
Status: CANCELLED | OUTPATIENT
Start: 2018-02-09

## 2018-02-09 RX ORDER — OXYTOCIN/0.9 % SODIUM CHLORIDE 30/500 ML
100-340 PLASTIC BAG, INJECTION (ML) INTRAVENOUS CONTINUOUS PRN
Status: CANCELLED | OUTPATIENT
Start: 2018-02-09

## 2018-02-09 RX ORDER — CARBOPROST TROMETHAMINE 250 UG/ML
250 INJECTION, SOLUTION INTRAMUSCULAR
Status: CANCELLED | OUTPATIENT
Start: 2018-02-09

## 2018-02-09 RX ORDER — ONDANSETRON 2 MG/ML
4 INJECTION INTRAMUSCULAR; INTRAVENOUS EVERY 6 HOURS PRN
Status: CANCELLED | OUTPATIENT
Start: 2018-02-09

## 2018-02-09 RX ORDER — IBUPROFEN 200 MG
800 TABLET ORAL
Status: CANCELLED | OUTPATIENT
Start: 2018-02-09

## 2018-02-09 RX ORDER — METHYLERGONOVINE MALEATE 0.2 MG/ML
200 INJECTION INTRAVENOUS
Status: CANCELLED | OUTPATIENT
Start: 2018-02-09

## 2018-02-09 RX ORDER — ACETAMINOPHEN 325 MG/1
650 TABLET ORAL EVERY 4 HOURS PRN
Status: CANCELLED | OUTPATIENT
Start: 2018-02-09

## 2018-02-09 RX ORDER — FENTANYL CITRATE 50 UG/ML
50-100 INJECTION, SOLUTION INTRAMUSCULAR; INTRAVENOUS
Status: CANCELLED | OUTPATIENT
Start: 2018-02-09

## 2018-02-10 LAB
BACTERIA SPEC CULT: NORMAL
SPECIMEN SOURCE: NORMAL

## 2018-02-14 ENCOUNTER — RADIANT APPOINTMENT (OUTPATIENT)
Dept: ULTRASOUND IMAGING | Facility: CLINIC | Age: 28
End: 2018-02-14
Attending: OBSTETRICS & GYNECOLOGY
Payer: COMMERCIAL

## 2018-02-14 ENCOUNTER — PRENATAL OFFICE VISIT (OUTPATIENT)
Dept: OBGYN | Facility: CLINIC | Age: 28
End: 2018-02-14
Payer: COMMERCIAL

## 2018-02-14 VITALS
TEMPERATURE: 97.8 F | SYSTOLIC BLOOD PRESSURE: 124 MMHG | HEART RATE: 78 BPM | DIASTOLIC BLOOD PRESSURE: 76 MMHG | BODY MASS INDEX: 30.54 KG/M2 | WEIGHT: 189.2 LBS

## 2018-02-14 DIAGNOSIS — O26.843 UTERINE SIZE-DATE DISCREPANCY IN THIRD TRIMESTER: ICD-10-CM

## 2018-02-14 DIAGNOSIS — O36.5931 IUGR (INTRAUTERINE GROWTH RESTRICTION) AFFECTING CARE OF MOTHER, THIRD TRIMESTER, FETUS 1: Primary | ICD-10-CM

## 2018-02-14 DIAGNOSIS — Z34.03 NORMAL FIRST PREGNANCY IN THIRD TRIMESTER: ICD-10-CM

## 2018-02-14 PROCEDURE — 99207 ZZC PRENATAL VISIT: CPT | Performed by: OBSTETRICS & GYNECOLOGY

## 2018-02-14 PROCEDURE — 76805 OB US >/= 14 WKS SNGL FETUS: CPT | Performed by: OBSTETRICS & GYNECOLOGY

## 2018-02-14 RX ORDER — KETOCONAZOLE 20 MG/G
CREAM TOPICAL DAILY
COMMUNITY
End: 2018-04-30

## 2018-02-14 NOTE — PROGRESS NOTES
37w1d  Here to discuss US results. Explained rationale for US (lagging fundal height). Discussed ultrasound findings today - EFW 2478g (16.9%), AC 2%. WAYNE 9.6cm. Recommend MFM US. Discussed that if isolated small AC, will likely recommend IOL at 39 weeks.   Active fetal movement. No leaking or bleeding, no contractions.   RTC weekly.  Samreen Fox MD

## 2018-02-14 NOTE — MR AVS SNAPSHOT
After Visit Summary   2/14/2018    Henrik Fletcher    MRN: [id number]           Patient Information     Date Of Birth          1990        Visit Information        Provider Department      2/14/2018 11:30 AM Samreen Fox MD; [facility]        Today's Diagnoses     IUGR (intrauterine growth restriction) affecting care of mother, third trimester, fetus 1    -  1       Follow-ups after your visit        Additional Services     MAT FETAL MED CTR REFERRAL-PREGNANCY       Estimated Date of Delivery: Mar 6, 2018 >> Patient may proceed with recommendations for further testing as directed by the Maternal Fetal Medicine Specialist >>    >> If requesting Fetal Echo: MFM will determine appropriate location for exam due to indication.    >> If requesting Lung Maturity Amnio:  If results indicate fetal lung maturity, induction or C/S is recommended within 36 hours.  Please schedule accordingly.     Dear Patient:   Please be aware that coverage of these services is subject to the terms and limitations of your health insurance plan.  Call member services at your health plan with any benefit or coverage questions.      Please bring the following to your appointment:    >>  Any x-rays, CTs or MRIs which have been performed.  Contact the facility where they were done to arrange for  prior to your scheduled appointment.  Any new CT, MRI or other procedures ordered by your specialist must be performed at a [place] facility or coordinated by your clinic's referral office.  >>  List of current medications   >>  This referral request   >>  Any documents/labs given to you for this referral                  Follow-up notes from your care team     Return in about 1 week (around 2/21/2018).      Your next 10 appointments already scheduled     Feb 15, 2018  4:15 PM CST   [place]ABL[place] PRENATAL with Casi Dowling MD   [facility]  [place]    [address] [facility]  [address]  [facility] [address]   [phone]            Feb 16, 2018 11:00 AM CST   (Arrive by 10:45 AM)   MFM US COMP with URMFMUSR4   [facility] [place] ([facility])    [address]  [facility] [id number] [phone]           Wear comfortable clothes and leave your valuables at home.            Feb 16, 2018 11:30 AM CST   Radiology MD with UR MARTHA JACOBO   [facility] [place] ([facility])    [address]  [facility] [address]   [phone]           Please arrive at the time given for your first appointment. This visit is used internally to schedule the physician's time during your ultrasound.            Feb 22, 2018  4:00 PM CST   ESTABLISHED PRENATAL with Casi Dowling MD   [facility] ([facility])    [address] [place] [address] [address]  [facility] [address] [phone]            Feb 26, 2018  4:15 PM CST   ESTABLISHED PRENATAL with Casi Dowling MD   [facility] ([facility])    [address] [place] [address] [address]  [facility] [address] [phone]              Future tests that were ordered for you today     Open Future Orders        Priority Expected Expires Ordered    [facility] US Comprehensive Single Routine  12/14/2018 2/14/2018            Who to contact     If you have questions or need follow up information about today's clinic visit or your schedule please contact [facility] directly at [phone].  Normal or non-critical lab and imaging results will be communicated to you by MyChart, letter or phone within 4 business days after the clinic has received the results. If you do not hear from us within 7 days, please contact the clinic through MyChart or phone. If you have a critical or abnormal  lab result, we will notify you by phone as soon as possible.  Submit refill requests through [employer] or call your pharmacy and they will forward the refill request to us. Please allow 3 business days for your refill to be completed.          Additional Information About Your Visit        Big Contactshart Information     [employer] gives you secure access to your electronic health record. If you see a primary care provider, you can also send messages to your care team and make appointments. If you have questions, please call your primary care clinic.  If you do not have a primary care provider, please call [phone] and they will assist you.        Care EveryWhere ID     This is your Care EveryWhere ID. This could be used by other organizations to access your [place] medical records  [id number]        Your Vitals Were     Pulse Temperature Last Period BMI (Body Mass Index)          78 97.8  F (36.6  C) (Oral) 05/30/2017 (Exact Date) 30.54 kg/m2         Blood Pressure from Last 3 Encounters:   02/14/18 124/76   02/06/18 122/76   01/29/18 129/77    Weight from Last 3 Encounters:   02/14/18 189 lb 3.2 oz (85.8 kg)   02/06/18 188 lb 14.4 oz (85.7 kg)   01/29/18 186 lb 8 oz (84.6 kg)              We Performed the Following     MAT FETAL MED CTR REFERRAL-PREGNANCY        Primary Care Provider Fax #    Physician No Ref-Primary [phone]       No address on file        Equal Access to Services     CRISTIANE TAYLOR : Hadii rustam ku hadasho Soelizabethali, waaxda luqadaha, qaybta kaalmada nany, angela ha . So [facility] [phone].    ATENCIÓN: Si habla español, tiene a ludwig disposición servicios gratuitos de asistencia lingüística. Llame al [phone].    We comply with applicable federal civil rights laws and [place] laws. We do not discriminate on the basis of race, color, national origin, age, disability, sex, sexual orientation, or gender identity.            Thank you!     Thank you for choosing  [facility]  for your care. Our goal is always to provide you with excellent care. Hearing back from our patients is one way we can continue to improve our services. Please take a few minutes to complete the written survey that you may receive in the mail after your visit with us. Thank you!             Your Updated Medication List - Protect others around you: Learn how to safely use, store and throw away your medicines at www.disposemymeds.org.          This list is accurate as of 2/14/18  2:24 PM.  Always use your most recent med list.                   Brand Name Dispense Instructions for use Diagnosis    HYDROCORTISONE PO           ketoconazole 2 % cream    NIZORAL     Apply topically daily        * order for DME     1 Device    Equipment being ordered: maternity support belt    Back pain affecting pregnancy in third trimester       * order for DME     1 Device    Equipment being ordered: double electric breast pump    Normal first pregnancy in third trimester       prenatal multivitamin plus iron 27-0.8 MG Tabs per tablet     100 tablet    Take 1 tablet by mouth daily    Supervision of normal first pregnancy       VITAMIN D-3 PO      Take 2,000 Int'l Units by mouth        * Notice:  This list has 2 medication(s) that are the same as other medications prescribed for you. Read the directions carefully, and ask your doctor or other care provider to review them with you.

## 2018-02-15 ENCOUNTER — PRENATAL OFFICE VISIT (OUTPATIENT)
Dept: OBGYN | Facility: CLINIC | Age: 28
End: 2018-02-15
Payer: COMMERCIAL

## 2018-02-15 VITALS
SYSTOLIC BLOOD PRESSURE: 133 MMHG | TEMPERATURE: 98.4 F | DIASTOLIC BLOOD PRESSURE: 84 MMHG | OXYGEN SATURATION: 99 % | HEIGHT: 66 IN | BODY MASS INDEX: 30.5 KG/M2 | WEIGHT: 189.8 LBS | HEART RATE: 78 BPM

## 2018-02-15 DIAGNOSIS — O26.843 UTERINE SIZE-DATE DISCREPANCY IN THIRD TRIMESTER: ICD-10-CM

## 2018-02-15 DIAGNOSIS — Z34.03 NORMAL FIRST PREGNANCY IN THIRD TRIMESTER: Primary | ICD-10-CM

## 2018-02-15 PROCEDURE — 99207 ZZC PRENATAL VISIT: CPT | Performed by: OBSTETRICS & GYNECOLOGY

## 2018-02-15 NOTE — PROGRESS NOTES
37w2d   Patient here with  and interpretor.  We reviewed US from yesterday again.   discussed that fetal growth appears to be lagging but that we plan to repeat the US at [facility] tomorrow and perform dopplers to assess FWB.  Reviewed possible placental insufficiency and if dopplers are abnormal she could require IOL now or anytime prior to her due date. Also discussed that she may just need to come back for close  surveillance.   She is feeling normal FM and that is reassuring. We also discussed + GBS and treatment.  the patient's questions  were answered.  F/u one week as planned if US is reassuring tomorrow.    Admit orders placed today.  ANIKET

## 2018-02-15 NOTE — MR AVS SNAPSHOT
After Visit Summary   2/15/2018    Henrik Fletcher    MRN: [id number]           Patient Information     Date Of Birth          1990        Visit Information        Provider Department      2/15/2018 4:15 PM Casi Dowling MD; [employer] [facility]        Today's Diagnoses     Normal first pregnancy in third trimester    -  1    Uterine size-date discrepancy in third trimester           Follow-ups after your visit        Your next 10 appointments already scheduled     Feb 16, 2018 11:00 AM CST   (Arrive by 10:45 AM)   MFM US COMP with [id number]   [facility] Maternal Fetal Medicine Ultrasound - [facility])    [address]  [facility] [id number] [phone]           Wear comfortable clothes and leave your valuables at home.            Feb 16, 2018 11:30 AM CST   Radiology MD with UR MARTHA JACOBO   [facility] Maternal Fetal Medicine - [facility])    [address]  [facility] [address]   [phone]           Please arrive at the time given for your first appointment. This visit is used internally to schedule the physician's time during your ultrasound.            Feb 22, 2018  4:00 PM CST   ESTABLISHED PRENATAL with Casi Dowling MD   [facility] ([facility])    [address] [place] [address]  [facility] [address] [phone]            Feb 26, 2018  4:15 PM CST   ESTABLISHED PRENATAL with Casi Dowling MD   [facility] ([facility])    [address] [place] [address]  [facility] [address] [phone]              Future tests that were ordered for you today     Open Future Orders        Priority Expected Expires Ordered    [facility] US Comprehensive Single Routine  12/14/2018 2/14/2018            Who to contact     If you have questions or need  "follow up information about today's clinic visit or your schedule please contact [facility] directly at [phone].  Normal or non-critical lab and imaging results will be communicated to you by earthminehart, letter or phone within 4 business days after the clinic has received the results. If you do not hear from us within 7 days, please contact the clinic through [employer] or phone. If you have a critical or abnormal lab result, we will notify you by phone as soon as possible.  Submit refill requests through [employer] or call your pharmacy and they will forward the refill request to us. Please allow 3 business days for your refill to be completed.          Additional Information About Your Visit        earthmineharWork 'n Gear Information     [employer] gives you secure access to your electronic health record. If you see a primary care provider, you can also send messages to your care team and make appointments. If you have questions, please call your primary care clinic.  If you do not have a primary care provider, please call [phone] and they will assist you.        Care EveryWhere ID     This is your Care EveryWhere ID. This could be used by other organizations to access your [place] medical records  [id number]        Your Vitals Were     Pulse Temperature Height Last Period Pulse Oximetry BMI (Body Mass Index)    78 98.4  F (36.9  C) (Oral) 5' 6\" (1.676 m) 05/30/2017 (Exact Date) 99% 30.63 kg/m2       Blood Pressure from Last 3 Encounters:   02/15/18 133/84   02/14/18 124/76   02/06/18 122/76    Weight from Last 3 Encounters:   02/15/18 189 lb 12.8 oz (86.1 kg)   02/14/18 189 lb 3.2 oz (85.8 kg)   02/06/18 188 lb 14.4 oz (85.7 kg)              Today, you had the following     No orders found for display         Today's Medication Changes          These changes are accurate as of 2/15/18  7:07 PM.  If you have any questions, ask your nurse or doctor.               These medicines have changed or have updated " prescriptions.        Dose/Directions    order for DME   This may have changed:  Another medication with the same name was removed. Continue taking this medication, and follow the directions you see here.   Used for:  Normal first pregnancy in third trimester   Changed by:  Casi Dowling MD        Equipment being ordered: double electric breast pump   Quantity:  1 Device   Refills:  0                Primary Care Provider Fax #    Physician No Ref-Primary [phone]       No address on file        Equal Access to Services     VINCE TAYLOR : Hadii rustam ku hadasho Soomaali, waaxda luqadaha, qaybta kaalmada adeegyada, waxay chastityin jamesn adeeg constantin laNanikayy . So [facility] [phone].    ATENCIÓN: Si habla español, tiene a ludwig disposición servicios gratuitos de asistencia lingüística. Llame al [phone].    We comply with applicable federal civil rights laws and [place] laws. We do not discriminate on the basis of race, color, national origin, age, disability, sex, sexual orientation, or gender identity.            Thank you!     Thank you for [place] [facility]  [place] your [place]. Our goal is always to provide you with excellent care. Hearing back from our patients is one way we can continue to improve our services. Please take a few minutes to complete the written survey that you may receive in the mail after your visit with us. Thank you!             Your Updated Medication List - Protect others around you: Learn how to safely use, store and throw away your medicines at www.disposemymeds.org.          This list is accurate as of 2/15/18  7:07 PM.  Always use your most recent med list.                   Brand Name Dispense Instructions for use Diagnosis    HYDROCORTISONE PO           ketoconazole 2 % cream    NIZORAL     Apply topically daily        order for DME     1 Device    Equipment being ordered: double electric breast pump    Normal first pregnancy in third trimester       prenatal  multivitamin plus iron 27-0.8 MG Tabs per tablet     100 tablet    Take 1 tablet by mouth daily    Supervision of normal first pregnancy       VITAMIN D-3 PO      Take 2,000 Int'l Units by mouth

## 2018-02-16 ENCOUNTER — HOSPITAL ENCOUNTER (OUTPATIENT)
Dept: ULTRASOUND IMAGING | Facility: CLINIC | Age: 28
Discharge: HOME | End: 2018-02-16
Attending: OBSTETRICS & GYNECOLOGY | Admitting: OBSTETRICS & GYNECOLOGY
Payer: COMMERCIAL

## 2018-02-16 ENCOUNTER — OFFICE VISIT (OUTPATIENT)
Dept: MATERNAL FETAL MEDICINE | Facility: CLINIC | Age: 28
End: 2018-02-16
Attending: OBSTETRICS & GYNECOLOGY
Payer: COMMERCIAL

## 2018-02-16 DIAGNOSIS — Z03.74 SUSPECTED PROBLEM WITH FETAL GROWTH NOT FOUND: ICD-10-CM

## 2018-02-16 DIAGNOSIS — O28.8 AFI (AMNIOTIC FLUID INDEX) BORDERLINE LOW: Primary | ICD-10-CM

## 2018-02-16 DIAGNOSIS — O26.90 PREGNANCY RELATED CONDITION, ANTEPARTUM: ICD-10-CM

## 2018-02-16 PROCEDURE — 76811 OB US DETAILED SNGL FETUS: CPT

## 2018-02-16 PROCEDURE — 76819 FETAL BIOPHYS PROFIL W/O NST: CPT | Performed by: OBSTETRICS & GYNECOLOGY

## 2018-02-16 NOTE — PROGRESS NOTES
"Please see \"Imaging\" tab under \"Chart Review\" for details of today's visit.    Nadja Jiang    "

## 2018-02-16 NOTE — MR AVS SNAPSHOT
After Visit Summary   2/16/2018    Henrik Fletcher    MRN: [id number]           Patient Information     Date Of Birth          1990        Visit Information        Provider Department      2/16/2018 11:30 AM Nadja Jiang MD [facility] Maternal Fetal Medicine - [place]        Today's Diagnoses     WAYNE (amniotic fluid index) borderline low    -  1    Suspected problem with fetal growth not found           Follow-ups after your visit        Your next 10 appointments already scheduled     Feb 19, 2018 10:15 AM CST   (Arrive by 10:00 AM)   MFM BPP SINGLE with URMFMUSR3   [facility] Maternal Fetal Medicine Ultrasound - [place] ([facility])    [address]  [facility] [address] [phone]            Feb 19, 2018 10:45 AM CST   Radiology MD with UR MARTHA JACOBO   [facility] Maternal Fetal Medicine - [place] ([facility])    [address]  [facility] [address]   [phone]           Please arrive at the time given for your first appointment. This visit is used internally to schedule the physician's time during your ultrasound.            Feb 22, 2018  4:00 PM CST   ESTABLISHED PRENATAL with Casi Dowling MD   [facility]    [address] [facility] [address]  [facility] [address] [phone]            Feb 26, 2018  4:15 PM CST   ESTABLISHED PRENATAL with Casi Dowling MD   [facility] ([facility]    [address] [facility] [address]  [facility] [address] [phone]              Future tests that were ordered for you today     Open Future Orders        Priority Expected Expires Ordered    MFM BPP Single Routine 2/27/2018 12/16/2018 2/16/2018    MFM BPP Single Routine 3/2/2018 12/16/2018 2/16/2018    MFM BPP Single Routine 2/23/2018 12/16/2018 2/16/2018    MFM BPP Single Routine 2/20/2018  12/16/2018 2/16/2018            Who to contact     If you have questions or need follow up information about today's clinic visit or your schedule please contact [facility] MATERNAL FETAL MEDICINE [facility] directly at [phone].  Normal or non-critical lab and imaging results will be communicated to you by MyChart, letter or phone within 4 business days after the clinic has received the results. If you do not hear from us within 7 days, please contact the clinic through [email or web address] or phone. If you have a critical or abnormal lab result, we will notify you by phone as soon as possible.  Submit refill requests through [employer] or call your pharmacy and they will forward the refill request to us. Please allow 3 business days for your refill to be completed.          Additional Information About Your Visit        [email or web address] Information     [employer] gives you secure access to your electronic health record. If you see a primary care provider, you can also send messages to your care team and make appointments. If you have questions, please call your primary care clinic.  If you do not have a primary care provider, please call [phone] and they will assist you.        Care EveryWhere ID     This is your Care EveryWhere ID. This could be used by other organizations to access your [place] medical records  [id number]        Your Vitals Were     Last Period                   05/30/2017 (Exact Date)            Blood Pressure from Last 3 Encounters:   02/15/18 133/84   02/14/18 124/76   02/06/18 122/76    Weight from Last 3 Encounters:   02/15/18 86.1 kg (189 lb 12.8 oz)   02/14/18 85.8 kg (189 lb 3.2 oz)   02/06/18 85.7 kg (188 lb 14.4 oz)               Primary Care Provider Fax #    Physician No Ref-Primary [phone]       No address on file        Equal Access to Services     CRISTIANE TAYLOR : Rg Bee, marquis isbell, charles ayon, angela zepeda. So [facility]  [phone].    ATENCIÓN: Si george mustafa, tiene a ludwig disposición servicios gratuitos de asistencia lingüística. Jaret nuñez [phone].    We comply with applicable federal civil rights laws and [place] laws. We do not discriminate on the basis of race, color, national origin, age, disability, sex, sexual orientation, or gender identity.            Thank you!     Thank you for choosing [facility]  for your care. Our goal is always to provide you with excellent care. Hearing back from our patients is one way we can continue to improve our services. Please take a few minutes to complete the written survey that you may receive in the mail after your visit with us. Thank you!             Your Updated Medication List - Protect others around you: Learn how to safely use, store and throw away your medicines at www.disposemymeds.org.          This list is accurate as of 2/16/18  2:36 PM.  Always use your most recent med list.                   Brand Name Dispense Instructions for use Diagnosis    HYDROCORTISONE PO           ketoconazole 2 % cream    NIZORAL     Apply topically daily        order for DME     1 Device    Equipment being ordered: double electric breast pump    Normal first pregnancy in third trimester       prenatal multivitamin plus iron 27-0.8 MG Tabs per tablet     100 tablet    Take 1 tablet by mouth daily    Supervision of normal first pregnancy       VITAMIN D-3 PO      Take 2,000 Int'l Units by mouth

## 2018-02-19 ENCOUNTER — HOSPITAL ENCOUNTER (OUTPATIENT)
Dept: ULTRASOUND IMAGING | Facility: CLINIC | Age: 28
Discharge: HOME | End: 2018-02-19
Attending: OBSTETRICS & GYNECOLOGY | Admitting: OBSTETRICS & GYNECOLOGY
Payer: COMMERCIAL

## 2018-02-19 ENCOUNTER — OFFICE VISIT (OUTPATIENT)
Dept: MATERNAL FETAL MEDICINE | Facility: CLINIC | Age: 28
End: 2018-02-19
Attending: OBSTETRICS & GYNECOLOGY
Payer: COMMERCIAL

## 2018-02-19 DIAGNOSIS — O28.8 AFI (AMNIOTIC FLUID INDEX) BORDERLINE LOW: ICD-10-CM

## 2018-02-19 DIAGNOSIS — O41.03X0 OLIGOHYDRAMNIOS IN THIRD TRIMESTER, SINGLE OR UNSPECIFIED FETUS: Primary | ICD-10-CM

## 2018-02-19 PROCEDURE — 76819 FETAL BIOPHYS PROFIL W/O NST: CPT

## 2018-02-19 NOTE — MR AVS SNAPSHOT
After Visit Summary   2/19/2018    Henrik Fletcher    MRN: [id number]           Patient Information     Date Of Birth          1990        Visit Information        Provider Department      2/19/2018 10:45 AM Emilie Cortes MD [facility] Maternal Fetal Medicine [facility]        Today's Diagnoses     Oligohydramnios in third trimester, single or unspecified fetus    -  1       Follow-ups after your visit        Your next 10 appointments already scheduled     Feb 22, 2018  3:00 PM CST   (Arrive by 2:45 PM)   MARTHA LAYTON SINGLE with [id number]   [facility] Maternal Fetal Medicine Ultrasound - [place] ([facility])    [address]  [facility] [id number] [phone]            Feb 22, 2018  3:30 PM CST   Radiology MD with UR MARTHA JACOBO   [facility] Maternal Fetal Medicine - [facility])    [address]  [facility] [id number] [phone]           Please arrive at the time given for your first appointment. This visit is used internally to schedule the physician's time during your ultrasound.            Feb 22, 2018  4:00 PM CST   ESTABLISHED PRENATAL with Casi Dowling MD   [facility]    [address] [place] [address]  [facility] [id number] [phone]            Feb 26, 2018  4:15 PM CST   ESTABLISHED PRENATAL with Casi Dowling MD   [facility]    [address] [place] [address] [place]  [facility] [address]  [facility] [id number] [phone]              Who to contact     If you have questions or need follow up information about today's clinic visit or your schedule please contact [facility] MATERNAL FETAL MEDICINE [facility] directly at [phone].  Normal or non-critical lab and imaging results will be communicated to you by MyChart, letter or phone within 4 business days after the clinic has  received the results. If you do not hear from us within 7 days, please contact the clinic through NullPointer or phone. If you have a critical or abnormal lab result, we will notify you by phone as soon as possible.  Submit refill requests through NullPointer or call your pharmacy and they will forward the refill request to us. Please allow 3 business days for your refill to be completed.          Additional Information About Your Visit        [employer] Information     NullPointer gives you secure access to your electronic health record. If you see a primary care provider, you can also send messages to your care team and make appointments. If you have questions, please call your primary care clinic.  If you do not have a primary care provider, please call [phone] and they will assist you.        Care EveryWhere ID     This is your Care EveryWhere ID. This could be used by other organizations to access your [place] medical records  [id number]        Your Vitals Were     Last Period                   05/30/2017 (Exact Date)            Blood Pressure from Last 3 Encounters:   02/15/18 133/84   02/14/18 124/76   02/06/18 122/76    Weight from Last 3 Encounters:   02/15/18 86.1 kg (189 lb 12.8 oz)   02/14/18 85.8 kg (189 lb 3.2 oz)   02/06/18 85.7 kg (188 lb 14.4 oz)              Today, you had the following     No orders found for display       Primary Care Provider Fax #    Physician No Ref-Primary [phone]       No address on file        Equal Access to Services     CRISTIANE TAYLOR : Hadii aad ku hadasho Soelizabethali, waaxda luqadaha, qaybta kaalmada adeegyada, angela ha . So [facility] [phone].    ATENCIÓN: Si habla español, tiene a ludwig disposición servicios gratuitos de asistencia lingüística. Llame al [phone].    We comply with applicable federal civil rights laws and [place] laws. We do not discriminate on the basis of race, color, national origin, age, disability, sex, sexual  orientation, or gender identity.            Thank you!     Thank you for choosing [facility]  for your care. Our goal is always to provide you with excellent care. Hearing back from our patients is one way we can continue to improve our services. Please take a few minutes to complete the written survey that you may receive in the mail after your visit with us. Thank you!             Your Updated Medication List - Protect others around you: Learn how to safely use, store and throw away your medicines at www.disposemymeds.org.          This list is accurate as of 2/19/18 11:14 AM.  Always use your most recent med list.                   Brand Name Dispense Instructions for use Diagnosis    HYDROCORTISONE PO           ketoconazole 2 % cream    NIZORAL     Apply topically daily        order for DME     1 Device    Equipment being ordered: double electric breast pump    Normal first pregnancy in third trimester       prenatal multivitamin plus iron 27-0.8 MG Tabs per tablet     100 tablet    Take 1 tablet by mouth daily    Supervision of normal first pregnancy       VITAMIN D-3 PO      Take 2,000 Int'l Units by mouth

## 2018-02-19 NOTE — PROGRESS NOTES
Please see full imaging report from ViewPoint program under imaging tab.    Given prior finding of low normal WAYNE (just over 5 cm), return later this week for one additional WAYNE check. If remains normal, no additional follow up is indicated unless other complications arise.     Emilie Cortes MD  Maternal Fetal Medicine

## 2018-02-22 ENCOUNTER — OFFICE VISIT (OUTPATIENT)
Dept: MATERNAL FETAL MEDICINE | Facility: CLINIC | Age: 28
End: 2018-02-22
Attending: OBSTETRICS & GYNECOLOGY
Payer: COMMERCIAL

## 2018-02-22 ENCOUNTER — HOSPITAL ENCOUNTER (OUTPATIENT)
Dept: ULTRASOUND IMAGING | Facility: CLINIC | Age: 28
Discharge: HOME | End: 2018-02-22
Attending: OBSTETRICS & GYNECOLOGY | Admitting: SOCIAL WORKER
Payer: COMMERCIAL

## 2018-02-22 ENCOUNTER — PRENATAL OFFICE VISIT (OUTPATIENT)
Dept: OBGYN | Facility: CLINIC | Age: 28
End: 2018-02-22
Payer: COMMERCIAL

## 2018-02-22 VITALS
BODY MASS INDEX: 31.15 KG/M2 | OXYGEN SATURATION: 100 % | HEIGHT: 66 IN | DIASTOLIC BLOOD PRESSURE: 77 MMHG | TEMPERATURE: 98.1 F | WEIGHT: 193.8 LBS | SYSTOLIC BLOOD PRESSURE: 139 MMHG | HEART RATE: 81 BPM

## 2018-02-22 DIAGNOSIS — O28.8 AFI (AMNIOTIC FLUID INDEX) BORDERLINE LOW: ICD-10-CM

## 2018-02-22 DIAGNOSIS — Z34.03 NORMAL FIRST PREGNANCY IN THIRD TRIMESTER: Primary | ICD-10-CM

## 2018-02-22 DIAGNOSIS — O28.8 AFI (AMNIOTIC FLUID INDEX) BORDERLINE LOW: Primary | ICD-10-CM

## 2018-02-22 PROCEDURE — 99207 ZZC PRENATAL VISIT: CPT | Performed by: OBSTETRICS & GYNECOLOGY

## 2018-02-22 PROCEDURE — 76819 FETAL BIOPHYS PROFIL W/O NST: CPT

## 2018-02-22 NOTE — PROGRESS NOTES
Please see ultrasound report under imaging tab for details on ultrasound performed today.    Samreen Bridges MD  , OB/GYN  Maternal-Fetal Medicine  [email or web address]  [phone] (Academic office)  [phone] (Pager)

## 2018-02-22 NOTE — PROGRESS NOTES
38w2d   No complaints.  Normal FM.   Had MFM US today and WAYNE was normal.    Will follow weekly WAYNE/BPP until delivered. ANIKET

## 2018-02-22 NOTE — MR AVS SNAPSHOT
After Visit Summary   2/22/2018    Henrik Fletcher    MRN: [id number]           Patient Information     Date Of Birth          1990        Visit Information        Provider Department      2/22/2018 4:00 PM Casi Dowling MD; [employer] [facility]        Today's Diagnoses     Normal first pregnancy in third trimester    -  1       Follow-ups after your visit        Your next 10 appointments already scheduled     Feb 26, 2018  4:15 PM CST   ESTABLISHED PRENATAL with Casi Dowling MD   [facility] ([facility])    [address] [place] [id number] [phone]              Future tests that were ordered for you today     Open Future Orders        Priority Expected Expires Ordered    US OB > 14 Weeks Complete Single Routine  2/22/2019 2/22/2018            Who to contact     If you have questions or need follow up information about today's clinic visit or your schedule please contact [facility] directly at [phone].  Normal or non-critical lab and imaging results will be communicated to you by MyChart, letter or phone within 4 business days after the clinic has received the results. If you do not hear from us within 7 days, please contact the clinic through Storrzhart or phone. If you have a critical or abnormal lab result, we will notify you by phone as soon as possible.  Submit refill requests through MVP Vault or call your pharmacy and they will forward the refill request to us. Please allow 3 business days for your refill to be completed.          Additional Information About Your Visit        MyChart Information     MVP Vault gives you secure access to your electronic health record. If you see a primary care provider, you can also send messages to your care team and make appointments. If you have questions, please call your primary care clinic.  If you do  "not have a primary care provider, please call [phone] and they will assist you.        Care EveryWhere ID     This is your Care EveryWhere ID. This could be used by other organizations to access your [place] medical records  [id number]        Your Vitals Were     Pulse Temperature Height Last Period Pulse Oximetry BMI (Body Mass Index)    81 98.1  F (36.7  C) (Oral) 5' 6\" (1.676 m) 05/30/2017 (Exact Date) 100% 31.28 kg/m2       Blood Pressure from Last 3 Encounters:   02/22/18 139/77   02/15/18 133/84   02/14/18 124/76    Weight from Last 3 Encounters:   02/22/18 193 lb 12.8 oz (87.9 kg)   02/15/18 189 lb 12.8 oz (86.1 kg)   02/14/18 189 lb 3.2 oz (85.8 kg)                 Today's Medication Changes          These changes are accurate as of 2/22/18  5:25 PM.  If you have any questions, ask your nurse or doctor.               Stop taking these medicines if you haven't already. Please contact your care team if you have questions.     order for DME   Stopped by:  Casi Dowling MD                    Primary Care Provider Fax #    Physician No Ref-Primary [phone]       No address on file        Equal Access to Services     CRISTIANE TAYLOR AH: Rg fuo Soandres, waaxda luqadaha, qaybta kaalmada adeegyada, angela ha . So [facility] [phone].    ATENCIÓN: Si habla español, tiene a ludwig disposición servicios gratuitos de asistencia lingüística. Llame al [phone].    We comply with applicable federal civil rights laws and [place] laws. We do not discriminate on the basis of race, color, national origin, age, disability, sex, sexual orientation, or gender identity.            Thank you!     Thank you for choosing [facility]  for your care. Our goal is always to provide you with excellent care. Hearing back from our patients is one way we can continue to improve our services. Please take a few minutes to complete the written survey that you may " receive in the mail after your visit with us. Thank you!             Your Updated Medication List - Protect others around you: Learn how to safely use, store and throw away your medicines at www.disposemymeds.org.          This list is accurate as of 2/22/18  5:25 PM.  Always use your most recent med list.                   Brand Name Dispense Instructions for use Diagnosis    HYDROCORTISONE PO           ketoconazole 2 % cream    NIZORAL     Apply topically daily        prenatal multivitamin plus iron 27-0.8 MG Tabs per tablet     100 tablet    Take 1 tablet by mouth daily    Supervision of normal first pregnancy       VITAMIN D-3 PO      Take 2,000 Int'l Units by mouth

## 2018-02-22 NOTE — MR AVS SNAPSHOT
After Visit Summary   2/22/2018    Henrik Fletcher    MRN: [id number]           Patient Information     Date Of Birth          1990        Visit Information        Provider Department      2/22/2018 3:30 PM Samreen Bridges MD [facility] Maternal Fetal Medicine [facility]        Today's Diagnoses     WAYNE (amniotic fluid index) borderline low    -  1       Follow-ups after your visit        Your next 10 appointments already scheduled     Feb 26, 2018  4:15 PM CST   ESTABLISHED PRENATAL with Casi Dowling MD   [facility]    [address] [place] [address] [phone]              Who to contact     If you have questions or need follow up information about today's clinic visit or your schedule please contact [facility] MATERNAL FETAL MEDICINE [facility] directly at [phone].  Normal or non-critical lab and imaging results will be communicated to you by MyChart, letter or phone within 4 business days after the clinic has received the results. If you do not hear from us within 7 days, please contact the clinic through [employer] Cohart or phone. If you have a critical or abnormal lab result, we will notify you by phone as soon as possible.  Submit refill requests through [employer] or call your pharmacy and they will forward the refill request to us. Please allow 3 business days for your refill to be completed.          Additional Information About Your Visit        MyChart Information     [employer] gives you secure access to your electronic health record. If you see a primary care provider, you can also send messages to your care team and make appointments. If you have questions, please call your primary care clinic.  If you do not have a primary care provider, please call [phone] and they will assist you.        Care EveryWhere ID     This is your Care EveryWhere ID. This could be used by other  organizations to access your [place] medical records  [id number]        Your Vitals Were     Last Period                   05/30/2017 (Exact Date)            Blood Pressure from Last 3 Encounters:   02/22/18 139/77   02/15/18 133/84   02/14/18 124/76    Weight from Last 3 Encounters:   02/22/18 87.9 kg (193 lb 12.8 oz)   02/15/18 86.1 kg (189 lb 12.8 oz)   02/14/18 85.8 kg (189 lb 3.2 oz)              Today, you had the following     No orders found for display         Today's Medication Changes          These changes are accurate as of 2/22/18  4:09 PM.  If you have any questions, ask your nurse or doctor.               Stop taking these medicines if you haven't already. Please contact your care team if you have questions.     order for DME   Stopped by:  Casi Dowling MD                    Primary Care Provider Fax #    Physician No Ref-Primary [phone]       No address on file        Equal Access to Services     [facility] : Hadii rustam fuo Soandres, waaxda luqadaha, qaybta kaalmada adeegyada, angela ha . So [facility] [phone].    ATENCIÓN: Si habla español, tiene a ludwig disposición servicios gratuitos de asistencia lingüística. Llame al [phone].    We comply with applicable federal civil rights laws and [place] laws. We do not discriminate on the basis of race, color, national origin, age, disability, sex, sexual orientation, or gender identity.            Thank you!     Thank you for choosing [facility]  for your care. Our goal is always to provide you with excellent care. Hearing back from our patients is one way we can continue to improve our services. Please take a few minutes to complete the written survey that you may receive in the mail after your visit with us. Thank you!             Your Updated Medication List - Protect others around you: Learn how to safely use, store and throw away your medicines at  www.disposemymeds.org.          This list is accurate as of 2/22/18  4:09 PM.  Always use your most recent med list.                   Brand Name Dispense Instructions for use Diagnosis    HYDROCORTISONE PO           ketoconazole 2 % cream    NIZORAL     Apply topically daily        prenatal multivitamin plus iron 27-0.8 MG Tabs per tablet     100 tablet    Take 1 tablet by mouth daily    Supervision of normal first pregnancy       VITAMIN D-3 PO      Take 2,000 Int'l Units by mouth

## 2018-02-26 ENCOUNTER — PRENATAL OFFICE VISIT (OUTPATIENT)
Dept: OBGYN | Facility: CLINIC | Age: 28
End: 2018-02-26
Payer: COMMERCIAL

## 2018-02-26 ENCOUNTER — RADIANT APPOINTMENT (OUTPATIENT)
Dept: ULTRASOUND IMAGING | Facility: CLINIC | Age: 28
End: 2018-02-26
Attending: OBSTETRICS & GYNECOLOGY
Payer: COMMERCIAL

## 2018-02-26 VITALS
HEIGHT: 66 IN | DIASTOLIC BLOOD PRESSURE: 84 MMHG | TEMPERATURE: 97.6 F | WEIGHT: 196.4 LBS | SYSTOLIC BLOOD PRESSURE: 116 MMHG | BODY MASS INDEX: 31.57 KG/M2

## 2018-02-26 DIAGNOSIS — O26.843 UTERINE SIZE-DATE DISCREPANCY IN THIRD TRIMESTER: ICD-10-CM

## 2018-02-26 DIAGNOSIS — Z34.03 NORMAL FIRST PREGNANCY IN THIRD TRIMESTER: Primary | ICD-10-CM

## 2018-02-26 DIAGNOSIS — Z34.03 NORMAL FIRST PREGNANCY IN THIRD TRIMESTER: ICD-10-CM

## 2018-02-26 PROCEDURE — 59025 FETAL NON-STRESS TEST: CPT | Performed by: OBSTETRICS & GYNECOLOGY

## 2018-02-26 PROCEDURE — 76819 FETAL BIOPHYS PROFIL W/O NST: CPT | Performed by: OBSTETRICS & GYNECOLOGY

## 2018-02-26 PROCEDURE — 99207 ZZC PRENATAL VISIT: CPT | Performed by: OBSTETRICS & GYNECOLOGY

## 2018-02-26 NOTE — PROGRESS NOTES
38w6d  No complaints.  Baby is moving well. No ctx's.    Size << dates today.  Last growth was 2 wks ago and ok.    BPP today is 8/8.  Cephalic and WAYNE is 9.2.    EFM:   135 baseline, moderate variablility, + accels, no decels, Category I  Will repeat growth US next week and consider IOL by 40 wks.  ANIKET

## 2018-02-26 NOTE — MR AVS SNAPSHOT
After Visit Summary   2/26/2018    Henrik Fletcher    MRN: [id number]           Patient Information     Date Of Birth          1990        Visit Information        Provider Department      2/26/2018 4:15 PM Casi Dowling MD; [employer] [facility]        Today's Diagnoses     Normal first pregnancy in third trimester    -  1    Uterine size-date discrepancy in third trimester           Follow-ups after your visit        Additional Services     MAT FETAL MED CTR REFERRAL-PREGNANCY       >> Patient may proceed with recommendations for further testing as directed by the Maternal Fetal Medicine Specialist >>    >> If requesting Fetal Echo: MFM will determine appropriate location for exam due to indication.    >> If requesting Lung Maturity Amnio:  If results indicate fetal lung maturity, induction or C/S is recommended within 36 hours.  Please schedule accordingly.     Dear Patient:   Please be aware that coverage of these services is subject to the terms and limitations of your health insurance plan.  Call member services at your health plan with any benefit or coverage questions.      Please bring the following to your appointment:    >>  Any x-rays, CTs or MRIs which have been performed.  Contact the facility where they were done to arrange for  prior to your scheduled appointment.  Any new CT, MRI or other procedures ordered by your specialist must be performed at a [facility] facility or coordinated by your clinic's referral office.  >>  List of current medications   >>  This referral request   >>  Any documents/labs given to you for this referral                  Your next 10 appointments already scheduled     Mar 05, 2018 11:45 AM CST   (Arrive by 11:30 AM)   MANEM US COMPRE SINGLE F/U with [id number]   [facility] Maternal Fetal Medicine Ultrasound - [place] ([facility])    [address]  [facility] [id number] [phone]           Wear comfortable clothes and leave your valuables at home.            Mar 05, 2018 12:15 PM CST   Radiology MD with UR MARTHA JACOBO   [facility] Maternal Fetal Medicine [facility] ([facility])    [address]  [facility] [address]   [phone]           Please arrive at the time given for your first appointment. This visit is used internally to schedule the physician's time during your ultrasound.            Mar 05, 2018  1:30 PM CST   ESTABLISHED PRENATAL with Casi Dowling MD   [facility])    [address] [place] [address] [address]  [facility] [id number] [phone]              Future tests that were ordered for you today     Open Future Orders        Priority Expected Expires Ordered    M US Comprehensive Single F/U Routine  2/26/2019 2/26/2018            Who to contact     If you have questions or need follow up information about today's clinic visit or your schedule please contact [facility] directly at [phone].  Normal or non-critical lab and imaging results will be communicated to you by Your Last Chancehart, letter or phone within 4 business days after the clinic has received the results. If you do not hear from us within 7 days, please contact the clinic through Your Last Chance[employer] or phone. If you have a critical or abnormal lab result, we will notify you by phone as soon as possible.  Submit refill requests through [employer] or call your pharmacy and they will forward the refill request to us. Please allow 3 business days for your refill to be completed.          Additional Information About Your Visit        [employer] Information     [employer] gives you secure access to your electronic health record. If you see a primary care provider, you can also send messages to your care team and make appointments. If you have questions, please call your primary care clinic.  If you  "do not have a primary care provider, please call [phone] and they will assist you.        Care EveryWhere ID     This is your Care EveryWhere ID. This could be used by other organizations to access your [place] medical records  [id number]        Your Vitals Were     Temperature Height Last Period BMI (Body Mass Index)          97.6  F (36.4  C) (Oral) 5' 6\" (1.676 m) 05/30/2017 (Exact Date) 31.7 kg/m2         Blood Pressure from Last 3 Encounters:   02/26/18 116/84   02/22/18 139/77   02/15/18 133/84    Weight from Last 3 Encounters:   02/26/18 196 lb 6.4 oz (89.1 kg)   02/22/18 193 lb 12.8 oz (87.9 kg)   02/15/18 189 lb 12.8 oz (86.1 kg)              We Performed the Following     FETAL NON-STRESS TEST     MAT FETAL MED CTR REFERRAL-PREGNANCY        Primary Care Provider Fax #    Physician No Ref-Primary [phone]       No address on file        Equal Access to Services     CRISTIANE TAYLOR : Hadii rustam madrigal Soandres, waaxda luqadaha, qaybta kaalmagato ayon, angela ha . So [facility] [phone].    ATENCIÓN: Si habla español, tiene a ludwig disposición servicios gratuitos de asistencia lingüística. Llame al [phone].    We comply with applicable federal civil rights laws and [place] laws. We do not discriminate on the basis of race, color, national origin, age, disability, sex, sexual orientation, or gender identity.            Thank you!     Thank you for choosing [facility] [place] [facility]  for your care. Our goal is always to provide you with excellent care. Hearing back from our patients is one way we can continue to improve our services. Please take a few minutes to complete the written survey that you may receive in the mail after your visit with us. Thank you!             Your Updated Medication List - Protect others around you: Learn how to safely use, store and throw away your medicines at www.disposemymeds.org.          This list is accurate as of 2/26/18  4:25 " PM.  Always use your most recent med list.                   Brand Name Dispense Instructions for use Diagnosis    HYDROCORTISONE PO           ketoconazole 2 % cream    NIZORAL     Apply topically daily        prenatal multivitamin plus iron 27-0.8 MG Tabs per tablet     100 tablet    Take 1 tablet by mouth daily    Supervision of normal first pregnancy       VITAMIN D-3 PO      Take 2,000 Int'l Units by mouth

## 2018-03-05 ENCOUNTER — HOSPITAL ENCOUNTER (OUTPATIENT)
Dept: ULTRASOUND IMAGING | Facility: CLINIC | Age: 28
Discharge: HOME | End: 2018-03-05
Attending: OBSTETRICS & GYNECOLOGY | Admitting: OBSTETRICS & GYNECOLOGY
Payer: COMMERCIAL

## 2018-03-05 ENCOUNTER — PRENATAL OFFICE VISIT (OUTPATIENT)
Dept: OBGYN | Facility: CLINIC | Age: 28
End: 2018-03-05
Payer: COMMERCIAL

## 2018-03-05 ENCOUNTER — TELEPHONE (OUTPATIENT)
Dept: OBGYN | Facility: CLINIC | Age: 28
End: 2018-03-05

## 2018-03-05 ENCOUNTER — HOSPITAL ENCOUNTER (OUTPATIENT)
Facility: CLINIC | Age: 28
Discharge: HOME | End: 2018-03-05
Attending: OBSTETRICS & GYNECOLOGY | Admitting: OBSTETRICS & GYNECOLOGY
Payer: COMMERCIAL

## 2018-03-05 ENCOUNTER — OFFICE VISIT (OUTPATIENT)
Dept: MATERNAL FETAL MEDICINE | Facility: CLINIC | Age: 28
End: 2018-03-05
Attending: OBSTETRICS & GYNECOLOGY
Payer: COMMERCIAL

## 2018-03-05 VITALS
SYSTOLIC BLOOD PRESSURE: 124 MMHG | WEIGHT: 191.9 LBS | DIASTOLIC BLOOD PRESSURE: 75 MMHG | HEIGHT: 66 IN | OXYGEN SATURATION: 99 % | HEART RATE: 76 BPM | TEMPERATURE: 97.4 F | BODY MASS INDEX: 30.84 KG/M2

## 2018-03-05 VITALS
TEMPERATURE: 98.4 F | SYSTOLIC BLOOD PRESSURE: 123 MMHG | WEIGHT: 191.9 LBS | BODY MASS INDEX: 30.84 KG/M2 | HEIGHT: 66 IN | DIASTOLIC BLOOD PRESSURE: 78 MMHG

## 2018-03-05 DIAGNOSIS — O26.90 PREGNANCY RELATED CONDITION, UNSPECIFIED TRIMESTER: ICD-10-CM

## 2018-03-05 DIAGNOSIS — Z36.89 ENCOUNTER FOR TRIAGE IN PREGNANT PATIENT: Primary | ICD-10-CM

## 2018-03-05 DIAGNOSIS — O28.3 ABNORMAL ULTRASONIC FINDING ON ANTENATAL SCREENING OF MOTHER, ANTEPARTUM: Primary | ICD-10-CM

## 2018-03-05 DIAGNOSIS — Z34.03 NORMAL FIRST PREGNANCY IN THIRD TRIMESTER: Primary | ICD-10-CM

## 2018-03-05 DIAGNOSIS — O26.90 PREGNANCY RELATED CONDITION, UNSPECIFIED TRIMESTER: Primary | ICD-10-CM

## 2018-03-05 PROCEDURE — 76816 OB US FOLLOW-UP PER FETUS: CPT

## 2018-03-05 PROCEDURE — 59025 FETAL NON-STRESS TEST: CPT | Mod: 26 | Performed by: OBSTETRICS & GYNECOLOGY

## 2018-03-05 PROCEDURE — 59025 FETAL NON-STRESS TEST: CPT | Mod: XU

## 2018-03-05 PROCEDURE — 99207 ZZC PRENATAL VISIT: CPT | Performed by: OBSTETRICS & GYNECOLOGY

## 2018-03-05 PROCEDURE — 59025 FETAL NON-STRESS TEST: CPT

## 2018-03-05 PROCEDURE — 40000068 ZZH STATISTIC EVAL-PTS ADMITTED TO OTHER DEPTS: Mod: ZF

## 2018-03-05 PROCEDURE — 99213 OFFICE O/P EST LOW 20 MIN: CPT | Mod: 25 | Performed by: OBSTETRICS & GYNECOLOGY

## 2018-03-05 PROCEDURE — G0463 HOSPITAL OUTPT CLINIC VISIT: HCPCS | Mod: 25

## 2018-03-05 PROCEDURE — 76818 FETAL BIOPHYS PROFILE W/NST: CPT | Performed by: OBSTETRICS & GYNECOLOGY

## 2018-03-05 NOTE — TELEPHONE ENCOUNTER
MFM referral placed for BPP later this week. Will call them in the morning since they are closed right now and see if I can coordinate appts. Otherwise pt can be scheduled at our clinic for BPP per SK.   Keri Dunlap RN-BSN

## 2018-03-05 NOTE — H&P
[facility], [place]    History and Physical  Obstetrics and Gynecology     Date of Admission:  3/5/2018    Assessment & Plan   Henrik Fletcher is a 28 year old female who presents for extended monitoring after BPP 6/8 and equivocal NST with [facility]  ASSESSMENT:   IUP @ 39w6d .  NST reactive.  Category  I    PLAN:   Discharge home.  Will f/u with BPP and PNV at the end of the week.    Samreen Mccartney    History of Present Illness   Henrik Fletcher is a 28 year old female  39w6d  Estimated Date of Delivery: 3/6/18 is calculated from Patient's last menstrual period was 2017 (exact date). [facility]  for [facility].  Was seen at [facility] for BPP, which was 6/8.  Had 20 min NST that was equivocal, so was sent for extended monitoring.  No contractions, vaginal bleeding or leakage of fluid.  Good fetal movement.    PRENATAL COURSE  Prenatal course was essentially uncomplicated      Recent Labs   Lab Test  17   1447   ABO  O   RH   Pos   AS  Neg     Rhogam not indicated   Recent Labs   Lab Test  18   1734  17   1436   HEPBANG   --   Nonreactive   HIAGAB   --   Nonreactive   HIV-1 p24 Ag & HIV-1/HIV-2 Ab Not Detected     GBS  Positive*   --    RUQIGG   --   126       Past Medical History    I have reviewed this patient's medical history and updated it with pertinent information if needed.   Past Medical History:   Diagnosis Date     NO ACTIVE PROBLEMS        Past Surgical History   I have reviewed this patient's surgical history and updated it with pertinent information if needed.  Past Surgical History:   Procedure Laterality Date     NO HISTORY OF SURGERY         Prior to Admission Medications   Prior to Admission Medications   Prescriptions Last Dose Informant Patient Reported? Taking?   Cholecalciferol (VITAMIN D-3 PO) 3/4/2018 at Unknown time  Yes Yes   Sig: Take 2,000 Int'l Units by mouth   HYDROCORTISONE PO Past Week at  Unknown time  Yes Yes   Prenatal Vit-Fe Fumarate-FA (PRENATAL MULTIVITAMIN  PLUS IRON) 27-0.8 MG TABS per tablet 3/4/2018 at Unknown time  No Yes   Sig: Take 1 tablet by mouth daily   ketoconazole (NIZORAL) 2 % cream Past Week at Unknown time  Yes Yes   Sig: Apply topically daily      Facility-Administered Medications: None     Allergies   No Known Allergies    Social History   I have reviewed this patient's social history and updated it with pertinent information if needed. Bunnythaalbania Fletcher  reports that she has never smoked. She has never used smokeless tobacco. She reports that she does not drink alcohol or use illicit drugs.    Family History   I have reviewed this patient's family history and updated it with pertinent information if needed.   History reviewed. No pertinent family history.    Immunization History   Immunization History   Administered Date(s) Administered     TDAP Vaccine (Adacel) 12/27/2017       Physical Exam   Temp: 98.4  F (36.9  C) Temp src: Oral BP: 123/78              Vital Signs with Ranges  Temp:  [97.4  F (36.3  C)-98.4  F (36.9  C)] 98.4  F (36.9  C)  Pulse:  [76] 76  BP: (123-124)/(75-78) 123/78  SpO2:  [99 %] 99 %      Fetal Heart Tones: 125 baseline, moderate variablility, + accels, no decels and Category I  TOCO:   quiet    Constitutional: healthy, alert and active   Neurologic: Awake, alert, oriented to name, place and time.  Cranial nerves II-XII are grossly intact.  Motor is 5 out of 5 bilaterally.  Cerebellar finger to nose, heel to shin intact.  Sensory is intact.  Babinski down going, Romberg negative, and gait is normal.  Neuropsychiatric: General: normal, calm and normal eye contact  Level of consciousness: alert / normal  Affect: normal

## 2018-03-05 NOTE — PLAN OF CARE
Data: Patient admitted to triage 2. Patient is a . Prenatal record reviewed.   Obstetric History       T0      L0     SAB0   TAB0   Ectopic0   Multiple0   Live Births0       # Outcome Date GA Lbr Syed/2nd Weight Sex Delivery Anes PTL Lv   1 Current               Here for equivocal NST in the clinic.  Medical History:   Past Medical History:   Diagnosis Date     NO ACTIVE PROBLEMS      Gestational age 39w6d. Vital signs per doc flowsheet. Fetal movement present. Patient reports No chief complaint on file.   as reason for admission. Support persons  present.  Action: Verbal consent for EFM, external fetal monitors applied. Admission assessment completed. Patient instructed to report change in fetal movement, contractions, vaginal leaking of fluid or bleeding, abdominal pain, or any concerns related to the pregnancy to her nurse/physician. Patient oriented to room, call light in reach.   Response: Dr. Mccartney informed of arrival and reactive NST.

## 2018-03-05 NOTE — PROGRESS NOTES
39w6d   Patient here from [facility].  Had a 6/8 BPP today.  2 off for lack of breathing. Patient had a NST that was equivocal today so Dr Stoll recommended extended monitoring on labor and delivery.   If NST is reactive patient can go home and await spontaneous labor.  Patient reports normal FM and rare ctx's. Patient will go to labor and delivery after stopping at home.  ANIKET

## 2018-03-05 NOTE — TELEPHONE ENCOUNTER
----- Message from Samreen Mccartney MD sent at 3/5/2018  5:34 PM CST -----  Regarding: BPP, PNV this week  Please schedule this patient for a prenatal visit and BPP at the end of this week.  Pt of Dr. Dowling, but not sure if what her schedule is like at the end of the week.    Thanks,  Samreen

## 2018-03-05 NOTE — IP AVS SNAPSHOT
[facility]    [address] [place] [address]    Phone:  [phone]                                       After Visit Summary   3/5/2018    Henrik Fletchre    MRN: [id number]           After Visit Summary Signature Page     I have received my discharge instructions, and my questions have been answered. I have discussed any challenges I see with this plan with the nurse or doctor.    ..........................................................................................................................................  Patient/Patient Representative Signature      ..........................................................................................................................................  Patient Representative Print Name and Relationship to Patient    ..................................................               ................................................  Date                                            Time    ..........................................................................................................................................  Reviewed by Signature/Title    ...................................................              ..............................................  Date                                                            Time

## 2018-03-05 NOTE — DISCHARGE INSTRUCTIONS
Discharge Instruction for Undelivered Patients      You were seen for: Fetal Assessment  We Consulted: Dr. Mccartney  You had (Test or Medicine): non stress test     Diet:   Drink 8 to 12 glasses of liquids (milk, juice, water) every day.     Activity:  Count fetal kicks everyday (see handout)     Call your provider if you notice:  Swelling in your face or increased swelling in your hands or legs.  Headaches that are not relieved by Tylenol (acetaminophen).  Changes in your vision (blurring: seeing spots or stars.)  Nausea (sick to your stomach) and vomiting (throwing up).   Weight gain of 5 pounds or more per week.  Heartburn that doesn't go away.  Signs of bladder infection: pain when you urinate (use the toilet), need to go more often and more urgently.  The bag of farias (rupture of membranes) breaks, or you notice leaking in your underwear.  Bright red blood in your underwear.  Abdominal (lower belly) or stomach pain.  For first baby: Contractions (tightening) less than 5 minutes apart for one hour or more.  Second (plus) baby: Contractions (tightening) less than 10 minutes apart and getting stronger.  *If less than 34 weeks: Contractions (tightenings) more than 6 times in one hour.  Increase or change in vaginal discharge (note the color and amount)      Follow-up:  The clinic will call you tomorrow to set up an appointment.

## 2018-03-05 NOTE — IP AVS SNAPSHOT
MRN:[id number]                      After Visit Summary   3/5/2018    Henrik Fletcher    MRN: [id number]           Thank you!     Thank you for choosing [facility] for your care. Our goal is always to provide you with excellent care. Hearing back from our patients is one way we can continue to improve our services. Please take a few minutes to complete the written survey that you may receive in the mail after you visit with us. Thank you!        Patient Information     Date Of Birth          1990        Designated Caregiver       Most Recent Value    Caregiver    Will someone help with your care after discharge? no      About your hospital stay     You were admitted on:  March 5, 2018 You last received care in the:  [facility]    You were discharged on:  March 5, 2018       Who to Call     For medical emergencies, please call 911.  For non-urgent questions about your medical care, please call your primary care provider or clinic, None          Attending Provider     Provider Specialty    Samreen Mccartney MD OB/Gyn       Primary Care Provider Fax #    Physician No Ref-Primary [phone]      Further instructions from your care team       Discharge Instruction for Undelivered Patients      You were seen for: Fetal Assessment  We Consulted: Dr. Mccartney  You had (Test or Medicine): non stress test     Diet:   Drink 8 to 12 glasses of liquids (milk, juice, water) every day.     Activity:  Count fetal kicks everyday (see handout)     Call your provider if you notice:  Swelling in your face or increased swelling in your hands or legs.  Headaches that are not relieved by Tylenol (acetaminophen).  Changes in your vision (blurring: seeing spots or stars.)  Nausea (sick to your stomach) and vomiting (throwing up).   Weight gain of 5 pounds or more per week.  Heartburn that doesn't go away.  Signs of bladder infection: pain when you urinate (use the toilet), need to go more often and more  "urgently.  The bag of farias (rupture of membranes) breaks, or you notice leaking in your underwear.  Bright red blood in your underwear.  Abdominal (lower belly) or stomach pain.  For first baby: Contractions (tightening) less than 5 minutes apart for one hour or more.  Second (plus) baby: Contractions (tightening) less than 10 minutes apart and getting stronger.  *If less than 34 weeks: Contractions (tightenings) more than 6 times in one hour.  Increase or change in vaginal discharge (note the color and amount)      Follow-up:  The clinic will call you tomorrow to set up an appointment.          Pending Results     No orders found from 3/3/2018 to 3/6/2018.            Admission Information     Date & Time Provider Department Dept. Phone    3/5/2018 Samreen Mccartney MD [facility] [phone]      Your Vitals Were     Blood Pressure Temperature Height Weight Last Period BMI (Body Mass Index)    123/78 98.4  F (36.9  C) (Oral) 1.676 m (5' 5.98\") 87 kg (191 lb 14.4 oz) 05/30/2017 (Exact Date) 30.99 kg/m2      MyChart Information     [employer] gives you secure access to your electronic health record. If you see a primary care provider, you can also send messages to your care team and make appointments. If you have questions, please call your primary care clinic.  If you do not have a primary care provider, please call [phone] and they will assist you.        Care EveryWhere ID     This is your Care EveryWhere ID. This could be used by other organizations to access your [place] medical records  [id number]        Equal Access to Services     [facility] Hospital[facility]ASON : Hadii rustam madrigal Soandres, waaxda luqadaha, qaybta kaalmada angela ayon . So [facility] [phone].    ATENCIÓN: Si habla español, tiene a ludwig disposición servicios gratuitos de asistencia lingüística. Llame al [phone].    We comply with applicable federal civil rights laws and [place] laws. We do not discriminate " on the basis of race, color, national origin, age, disability, sex, sexual orientation, or gender identity.               Review of your medicines      CONTINUE these medicines which have NOT CHANGED        Dose / Directions    HYDROCORTISONE PO        Refills:  0       ketoconazole 2 % cream   Commonly known as:  NIZORAL        Apply topically daily   Refills:  0       prenatal multivitamin plus iron 27-0.8 MG Tabs per tablet   Used for:  Supervision of normal first pregnancy        Dose:  1 tablet   Take 1 tablet by mouth daily   Quantity:  100 tablet   Refills:  3       VITAMIN D-3 PO        Dose:  2000 Int'l Units   Take 2,000 Int'l Units by mouth   Refills:  0                Protect others around you: Learn how to safely use, store and throw away your medicines at www.disposemymeds.org.             Medication List: This is a list of all your medications and when to take them. Check marks below indicate your daily home schedule. Keep this list as a reference.      Medications           Morning Afternoon Evening Bedtime As Needed    HYDROCORTISONE PO                                ketoconazole 2 % cream   Commonly known as:  NIZORAL   Apply topically daily                                prenatal multivitamin plus iron 27-0.8 MG Tabs per tablet   Take 1 tablet by mouth daily                                VITAMIN D-3 PO   Take 2,000 Int'l Units by mouth                                          More Information        Kick Counts  It s normal to worry about your baby s health. One way you can know your baby s doing well is to record the baby s movements once a day. This is called a kick count. You will usually feel your baby move by the 20th week of pregnancy. Remember to take your kick count records to all your appointments with your healthcare provider.       How to Count Kicks    Choose a time when the baby is active, such as after a meal.     Sit comfortably or lie on your side.     The first time the baby  moves, write down the time.     Count each movement until the baby has moved 10 times. This can take from 20 minutes to 2 hours.     If the baby hasn t moved 4 times in 1 hour, pat your stomach to wake the baby up.    Write down the time you feel the baby s 10th movement.    Try to do it at the same time each day.  When to Call Your Healthcare Provider  Call your healthcare provider right away if you notice any of the following:    Your baby moves fewer than 10 times in 2 hours while you re doing kick counts.    Your baby moves much less often than on the days before.    You have not felt your baby move all day.    [id number] [employer]. [address] [place] [address], [place] [address]. All rights reserved. This information is not intended as a substitute for professional medical care. Always follow your healthcare professional's instructions.  This information has been modified by your health care provider with permission from the publisher.

## 2018-03-05 NOTE — NURSING NOTE
D: Patient had a BPP of 6/8, placed on the monitor per Dr. Stoll. Monitored tracing for 40 minutes. Baseline was difficult to establish, so recommendation per Dr. Stoll was for patient to report to Dr. Dowling visit then go to labor and delivery for extended monitoring. Labor and delivery charge nurse notified and patient instructed to report to Dr. Dowling office. Patient left clinic ambulatory.   Carmelita Nazario RN

## 2018-03-05 NOTE — MR AVS SNAPSHOT
"              After Visit Summary   3/5/2018    Henrik Fletcher    MRN: [id number]           Patient Information     Date Of Birth          1990        Visit Information        Provider Department      3/5/2018 1:15 PM Casi Dowling MD; [employer] [facility]        Today's Diagnoses     Normal first pregnancy in third trimester    -  1       Follow-ups after your visit        Who to contact     If you have questions or need follow up information about today's clinic visit or your schedule please contact [facility] directly at [phone].  Normal or non-critical lab and imaging results will be communicated to you by MyChart, letter or phone within 4 business days after the clinic has received the results. If you do not hear from us within 7 days, please contact the clinic through [employer] or phone. If you have a critical or abnormal lab result, we will notify you by phone as soon as possible.  Submit refill requests through Button or call your pharmacy and they will forward the refill request to us. Please allow 3 business days for your refill to be completed.          Additional Information About Your Visit        MyChart Information     [employer] gives you secure access to your electronic health record. If you see a primary care provider, you can also send messages to your care team and make appointments. If you have questions, please call your primary care clinic.  If you do not have a primary care provider, please call [phone] and they will assist you.        Care EveryWhere ID     This is your Care EveryWhere ID. This could be used by other organizations to access your [place] medical records  [id number]        Your Vitals Were     Pulse Temperature Height Last Period Pulse Oximetry BMI (Body Mass Index)    76 97.4  F (36.3  C) (Oral) 5' 6\" (1.676 m) 05/30/2017 (Exact Date) 99% 30.97 kg/m2       Blood Pressure from Last 3 " Encounters:   03/05/18 124/75   02/26/18 116/84   02/22/18 139/77    Weight from Last 3 Encounters:   03/05/18 191 lb 14.4 oz (87 kg)   02/26/18 196 lb 6.4 oz (89.1 kg)   02/22/18 193 lb 12.8 oz (87.9 kg)              Today, you had the following     No orders found for display       Primary Care Provider Fax #    Physician No Ref-Primary [phone]       No address on file        Equal Access to Services     CRISTIANE TAYLOR : Hadii aad ku hadasho Soomaali, waaxda luqadaha, qaybta kaalmada adeegyada, angela ha . So [facility] [phone].    ATENCIÓN: Si habla español, tiene a ludwig disposición servicios gratuitos de asistencia lingüística. Llame al [phone].    We comply with applicable federal civil rights laws and [place] laws. We do not discriminate on the basis of race, color, national origin, age, disability, sex, sexual orientation, or gender identity.            Thank you!     Thank you for choosing [facility]  for your care. Our goal is always to provide you with excellent care. Hearing back from our patients is one way we can continue to improve our services. Please take a few minutes to complete the written survey that you may receive in the mail after your visit with us. Thank you!             Your Updated Medication List - Protect others around you: Learn how to safely use, store and throw away your medicines at www.disposemymeds.org.          This list is accurate as of 3/5/18  4:29 PM.  Always use your most recent med list.                   Brand Name Dispense Instructions for use Diagnosis    HYDROCORTISONE PO           ketoconazole 2 % cream    NIZORAL     Apply topically daily        prenatal multivitamin plus iron 27-0.8 MG Tabs per tablet     100 tablet    Take 1 tablet by mouth daily    Supervision of normal first pregnancy       VITAMIN D-3 PO      Take 2,000 Int'l Units by mouth

## 2018-03-05 NOTE — MR AVS SNAPSHOT
After Visit Summary   3/5/2018    Henrik Fletcher    MRN: [id number]           Patient Information     Date Of Birth          1990        Visit Information        Provider Department      3/5/2018 12:15 PM Margaret Stoll, DO [facility] Maternal Fetal Medicine [facility]        Today's Diagnoses     Abnormal ultrasonic finding on  screening of mother, antepartum    -  1       Follow-ups after your visit        Who to contact     If you have questions or need follow up information about today's clinic visit or your schedule please contact [facility] directly at [phone].  Normal or non-critical lab and imaging results will be communicated to you by B[employer]hart, letter or phone within 4 business days after the clinic has received the results. If you do not hear from us within 7 days, please contact the clinic through [employer]ceipt[employer] or phone. If you have a critical or abnormal lab result, we will notify you by phone as soon as possible.  Submit refill requests through [employer] or call your pharmacy and they will forward the refill request to us. Please allow 3 business days for your refill to be completed.          Additional Information About Your Visit        MyChart Information     [employer] gives you secure access to your electronic health record. If you see a primary care provider, you can also send messages to your care team and make appointments. If you have questions, please call your primary care clinic.  If you do not have a primary care provider, please call [phone] and they will assist you.        Care EveryWhere ID     This is your Care EveryWhere ID. This could be used by other organizations to access your [place] medical records  [id number]        Your Vitals Were     Last Period                   2017 (Exact Date)            Blood Pressure from Last 3 Encounters:   18 124/75   18 116/84   18 139/77     Weight from Last 3 Encounters:   03/05/18 87 kg (191 lb 14.4 oz)   02/26/18 89.1 kg (196 lb 6.4 oz)   02/22/18 87.9 kg (193 lb 12.8 oz)              We Performed the Following     FETAL NON-STRESS TEST        Primary Care Provider Fax #    Physician No Ref-Primary [phone]       No address on file        Equal Access to Services     VINCE TAYLOR : Hadii aad ku hadasho Soomaali, waaxda luqadaha, qaybta kaalmada adeporfirioyada, angela ho jamesn noah constantin leland . So [facility] [phone].    ATENCIÓN: Si habla español, tiene a ludwig disposición servicios gratuitos de asistencia lingüística. Llame al [phone].    We comply with applicable federal civil rights laws and [place] laws. We do not discriminate on the basis of race, color, national origin, age, disability, sex, sexual orientation, or gender identity.            Thank you!     Thank you for choosing [facility]  for your care. Our goal is always to provide you with excellent care. Hearing back from our patients is one way we can continue to improve our services. Please take a few minutes to complete the written survey that you may receive in the mail after your visit with us. Thank you!             Your Updated Medication List - Protect others around you: Learn how to safely use, store and throw away your medicines at www.disposemymeds.org.          This list is accurate as of 3/5/18  4:01 PM.  Always use your most recent med list.                   Brand Name Dispense Instructions for use Diagnosis    HYDROCORTISONE PO           ketoconazole 2 % cream    NIZORAL     Apply topically daily        prenatal multivitamin plus iron 27-0.8 MG Tabs per tablet     100 tablet    Take 1 tablet by mouth daily    Supervision of normal first pregnancy       VITAMIN D-3 PO      Take 2,000 Int'l Units by mouth

## 2018-03-06 NOTE — PLAN OF CARE
FHT's 120's with moderate variability and accelerations. Reactive NST. No contractions, bleeding nor SROM/leaking. VSS. Discharged home with written and verbal instructions.

## 2018-03-06 NOTE — TELEPHONE ENCOUNTER
TC to MFM. Scheduled BPP for 9:30am on Fri 3/9 with PNV with SL at 10:15am. TC to patient with . LMTC.   Keri Dunlap, ELVIE-BSN

## 2018-03-09 ENCOUNTER — HOSPITAL ENCOUNTER (INPATIENT)
Facility: CLINIC | Age: 28
LOS: 4 days | Discharge: HOME | End: 2018-03-13
Attending: OBSTETRICS & GYNECOLOGY | Admitting: OBSTETRICS & GYNECOLOGY
Payer: COMMERCIAL

## 2018-03-09 ENCOUNTER — OFFICE VISIT (OUTPATIENT)
Dept: MATERNAL FETAL MEDICINE | Facility: CLINIC | Age: 28
End: 2018-03-09
Attending: OBSTETRICS & GYNECOLOGY
Payer: COMMERCIAL

## 2018-03-09 ENCOUNTER — HOSPITAL ENCOUNTER (OUTPATIENT)
Dept: ULTRASOUND IMAGING | Facility: CLINIC | Age: 28
Discharge: HOME | End: 2018-03-09
Attending: OBSTETRICS & GYNECOLOGY | Admitting: OBSTETRICS & GYNECOLOGY
Payer: COMMERCIAL

## 2018-03-09 ENCOUNTER — PRENATAL OFFICE VISIT (OUTPATIENT)
Dept: OBGYN | Facility: CLINIC | Age: 28
End: 2018-03-09
Payer: COMMERCIAL

## 2018-03-09 VITALS
WEIGHT: 191 LBS | SYSTOLIC BLOOD PRESSURE: 121 MMHG | BODY MASS INDEX: 30.84 KG/M2 | DIASTOLIC BLOOD PRESSURE: 79 MMHG | HEART RATE: 83 BPM | TEMPERATURE: 97.8 F

## 2018-03-09 DIAGNOSIS — O36.5931 IUGR (INTRAUTERINE GROWTH RESTRICTION) AFFECTING CARE OF MOTHER, THIRD TRIMESTER, FETUS 1: ICD-10-CM

## 2018-03-09 DIAGNOSIS — O26.90 PREGNANCY RELATED CONDITION, UNSPECIFIED TRIMESTER: ICD-10-CM

## 2018-03-09 DIAGNOSIS — Z34.03 NORMAL FIRST PREGNANCY IN THIRD TRIMESTER: Primary | ICD-10-CM

## 2018-03-09 DIAGNOSIS — O48.0 POST-TERM PREGNANCY, 40-42 WEEKS OF GESTATION: Primary | ICD-10-CM

## 2018-03-09 LAB
ABO + RH BLD: NORMAL
ABO + RH BLD: NORMAL
BASOPHILS # BLD AUTO: 0 10E9/L (ref 0–0.2)
BASOPHILS NFR BLD AUTO: 0.1 %
BLD GP AB SCN SERPL QL: NORMAL
BLOOD BANK CMNT PATIENT-IMP: NORMAL
DIFFERENTIAL METHOD BLD: ABNORMAL
EOSINOPHIL # BLD AUTO: 0 10E9/L (ref 0–0.7)
EOSINOPHIL NFR BLD AUTO: 0.3 %
ERYTHROCYTE [DISTWIDTH] IN BLOOD BY AUTOMATED COUNT: 13.3 % (ref 10–15)
HCT VFR BLD AUTO: 37.8 % (ref 35–47)
HGB BLD-MCNC: 12.2 G/DL (ref 11.7–15.7)
IMM GRANULOCYTES # BLD: 0 10E9/L (ref 0–0.4)
IMM GRANULOCYTES NFR BLD: 0.3 %
LYMPHOCYTES # BLD AUTO: 2.2 10E9/L (ref 0.8–5.3)
LYMPHOCYTES NFR BLD AUTO: 18.8 %
MCH RBC QN AUTO: 31 PG (ref 26.5–33)
MCHC RBC AUTO-ENTMCNC: 32.3 G/DL (ref 31.5–36.5)
MCV RBC AUTO: 96 FL (ref 78–100)
MONOCYTES # BLD AUTO: 0.7 10E9/L (ref 0–1.3)
MONOCYTES NFR BLD AUTO: 5.5 %
NEUTROPHILS # BLD AUTO: 8.8 10E9/L (ref 1.6–8.3)
NEUTROPHILS NFR BLD AUTO: 75 %
NRBC # BLD AUTO: 0 10*3/UL
NRBC BLD AUTO-RTO: 0 /100
PLATELET # BLD AUTO: 153 10E9/L (ref 150–450)
RBC # BLD AUTO: 3.94 10E12/L (ref 3.8–5.2)
SPECIMEN EXP DATE BLD: NORMAL
WBC # BLD AUTO: 11.8 10E9/L (ref 4–11)

## 2018-03-09 PROCEDURE — 36415 COLL VENOUS BLD VENIPUNCTURE: CPT | Performed by: STUDENT IN AN ORGANIZED HEALTH CARE EDUCATION/TRAINING PROGRAM

## 2018-03-09 PROCEDURE — 86901 BLOOD TYPING SEROLOGIC RH(D): CPT | Performed by: STUDENT IN AN ORGANIZED HEALTH CARE EDUCATION/TRAINING PROGRAM

## 2018-03-09 PROCEDURE — 86900 BLOOD TYPING SEROLOGIC ABO: CPT | Performed by: STUDENT IN AN ORGANIZED HEALTH CARE EDUCATION/TRAINING PROGRAM

## 2018-03-09 PROCEDURE — 85025 COMPLETE CBC W/AUTO DIFF WBC: CPT | Performed by: STUDENT IN AN ORGANIZED HEALTH CARE EDUCATION/TRAINING PROGRAM

## 2018-03-09 PROCEDURE — 25000132 ZZH RX MED GY IP 250 OP 250 PS 637: Performed by: STUDENT IN AN ORGANIZED HEALTH CARE EDUCATION/TRAINING PROGRAM

## 2018-03-09 PROCEDURE — 99207 ZZC PRENATAL VISIT: CPT | Performed by: OBSTETRICS & GYNECOLOGY

## 2018-03-09 PROCEDURE — 86850 RBC ANTIBODY SCREEN: CPT | Performed by: STUDENT IN AN ORGANIZED HEALTH CARE EDUCATION/TRAINING PROGRAM

## 2018-03-09 PROCEDURE — 86780 TREPONEMA PALLIDUM: CPT | Performed by: STUDENT IN AN ORGANIZED HEALTH CARE EDUCATION/TRAINING PROGRAM

## 2018-03-09 PROCEDURE — 76819 FETAL BIOPHYS PROFIL W/O NST: CPT

## 2018-03-09 PROCEDURE — 12000032 ZZH R&B OB CRITICAL UMMC

## 2018-03-09 RX ORDER — IBUPROFEN 800 MG/1
800 TABLET, FILM COATED ORAL
Status: DISCONTINUED | OUTPATIENT
Start: 2018-03-09 | End: 2018-03-11

## 2018-03-09 RX ORDER — MISOPROSTOL 100 UG/1
25 TABLET ORAL
Status: DISCONTINUED | OUTPATIENT
Start: 2018-03-09 | End: 2018-03-10

## 2018-03-09 RX ORDER — OXYCODONE AND ACETAMINOPHEN 5; 325 MG/1; MG/1
1 TABLET ORAL
Status: DISCONTINUED | OUTPATIENT
Start: 2018-03-09 | End: 2018-03-11

## 2018-03-09 RX ORDER — NALOXONE HYDROCHLORIDE 0.4 MG/ML
.1-.4 INJECTION, SOLUTION INTRAMUSCULAR; INTRAVENOUS; SUBCUTANEOUS
Status: DISCONTINUED | OUTPATIENT
Start: 2018-03-09 | End: 2018-03-11

## 2018-03-09 RX ORDER — ACETAMINOPHEN 325 MG/1
650 TABLET ORAL EVERY 4 HOURS PRN
Status: DISCONTINUED | OUTPATIENT
Start: 2018-03-09 | End: 2018-03-11

## 2018-03-09 RX ORDER — OXYTOCIN/0.9 % SODIUM CHLORIDE 30/500 ML
100-340 PLASTIC BAG, INJECTION (ML) INTRAVENOUS CONTINUOUS PRN
Status: DISCONTINUED | OUTPATIENT
Start: 2018-03-09 | End: 2018-03-11

## 2018-03-09 RX ORDER — PENICILLIN G POTASSIUM 5000000 [IU]/1
5 INJECTION, POWDER, FOR SOLUTION INTRAMUSCULAR; INTRAVENOUS ONCE
Status: COMPLETED | OUTPATIENT
Start: 2018-03-09 | End: 2018-03-11

## 2018-03-09 RX ORDER — SODIUM CHLORIDE, SODIUM LACTATE, POTASSIUM CHLORIDE, CALCIUM CHLORIDE 600; 310; 30; 20 MG/100ML; MG/100ML; MG/100ML; MG/100ML
INJECTION, SOLUTION INTRAVENOUS CONTINUOUS
Status: DISCONTINUED | OUTPATIENT
Start: 2018-03-09 | End: 2018-03-11

## 2018-03-09 RX ORDER — CARBOPROST TROMETHAMINE 250 UG/ML
250 INJECTION, SOLUTION INTRAMUSCULAR
Status: DISCONTINUED | OUTPATIENT
Start: 2018-03-09 | End: 2018-03-11

## 2018-03-09 RX ORDER — ONDANSETRON 2 MG/ML
4 INJECTION INTRAMUSCULAR; INTRAVENOUS EVERY 6 HOURS PRN
Status: DISCONTINUED | OUTPATIENT
Start: 2018-03-09 | End: 2018-03-11

## 2018-03-09 RX ORDER — METHYLERGONOVINE MALEATE 0.2 MG/ML
200 INJECTION INTRAVENOUS
Status: DISCONTINUED | OUTPATIENT
Start: 2018-03-09 | End: 2018-03-11

## 2018-03-09 RX ORDER — OXYTOCIN 10 [USP'U]/ML
10 INJECTION, SOLUTION INTRAMUSCULAR; INTRAVENOUS
Status: DISCONTINUED | OUTPATIENT
Start: 2018-03-09 | End: 2018-03-11

## 2018-03-09 RX ADMIN — Medication 25 MCG: at 22:41

## 2018-03-09 RX ADMIN — Medication 25 MCG: at 18:41

## 2018-03-09 RX ADMIN — Medication 25 MCG: at 16:35

## 2018-03-09 RX ADMIN — Medication 25 MCG: at 20:41

## 2018-03-09 NOTE — PROVIDER NOTIFICATION
03/09/18 1505   Provider Notification   Provider Name/Title Dr Champagne   Method of Notification At Bedside   Request Evaluate in Person   Notification Reason Patient Arrived   G2 Dr Champagne at bedside using  via ipad. Discussing different cervical ripening options with pt and spouse.

## 2018-03-09 NOTE — IP AVS SNAPSHOT
MRN:[id number]                      After Visit Summary   3/9/2018    Henrik Fletcher    MRN: [id number]           Thank you!     Thank you for choosing [facility] for your care. Our goal is always to provide you with excellent care. Hearing back from our patients is one way we can continue to improve our services. Please take a few minutes to complete the written survey that you may receive in the mail after you visit with us. Thank you!        Patient Information     Date Of Birth          1990        Designated Caregiver       Most Recent Value    Caregiver    Will someone help with your care after discharge? no      About your hospital stay     You were admitted on:  March 9, 2018 You last received care in the:  [facility]    You were discharged on:  March 13, 2018        Reason for your hospital stay       Maternity care                  Who to Call     For medical emergencies, please call 911.  For non-urgent questions about your medical care, please call your primary care provider or clinic, None          Attending Provider     Provider Specialty    Sherri Hooper MD OB/Gyn    Kristine Shah MD OB/Gyn       Primary Care Provider Fax #    Physician No Ref-Primary [phone]      After Care Instructions     Activity       Review discharge instructions            Diet       Resume previous diet            Discharge Instructions       Call the OB clinic or return to the ED if you have any of the following:    - Temperature greater than 100.4F  - Pain not controlled by pain medications  - any symptoms or preeclampsia, including: Severe headache, visual changes, chest pain, shortness of breath, pain in the upper right abdomen, or sudden increase in swelling  - Uncontrolled nausea/vomiting  - Foul-smelling vaginal discharge  - Vaginal bleeding soaking 1 pad per hour for 2 hours in a row            Discharge Instructions - Postpartum visit       Schedule postpartum  visit with your provider and return to clinic in 6 weeks.                  Follow-up Appointments     Follow Up and recommended labs and tests       Follow up with OB for routine postpartum visit in 6 weeks.                  Further instructions from your care team       Postpartum Vaginal Delivery Instructions    Activity       Ask family and friends for help when you need it.    Do not place anything in your vagina for 6 weeks.    You are not restricted on other activities, but take it easy for a few weeks to allow your body to recover from delivery.  You are able to do any activities you feel up to that point.    No driving until you have stopped taking your pain medications (usually two weeks after delivery).     Call your health care provider if you have any of these symptoms:       Increased pain, swelling, redness, or fluid around your stiches from an episiotomy or perineal tear.    A fever above 100.4 F (38 C) with or without chills when placing a thermometer under your tongue.    You soak a sanitary pad with blood within 1 hour, or you see blood clots larger than a golf ball.    Bleeding that lasts more than 6 weeks.    Vaginal discharge that smells bad.    Severe pain, cramping or tenderness in your lower belly area.    A need to urinate more frequently (use the toilet more often), more urgently (use the toilet very quickly), or it burns when you urinate.    Nausea and vomiting.    Redness, swelling or pain around a vein in your leg.    Problems breastfeeding or a red or painful area on your breast.    Chest pain and cough or are gasping for air.    Problems coping with sadness, anxiety, or depression.  If you have any concerns about hurting yourself or the baby, call your provider immediately.     You have questions or concerns after you return home.     Keep your hands clean:  Always wash your hands before touching your perineal area and stitches.  This helps reduce your risk of infection.  If your hands  aren't dirty, you may use an alcohol hand-rub to clean your hands. Keep your nails clean and short.        Pending Results     Date and Time Order Name Status Description    3/11/2018 1505 Placenta path order and indications In process             Statement of Approval     Ordered          03/13/18 0905  I have reviewed and agree with all the recommendations and orders detailed in this document.  EFFECTIVE NOW     Approved and electronically signed by:  Sherri Hooper MD             Admission Information     Date & Time Provider Department Dept. Phone    3/9/2018 Kristine Shah MD [facility] [phone]      Your Vitals Were     Blood Pressure Pulse Temperature Respirations Last Period Pulse Oximetry    122/72 80 97.7  F (36.5  C) (Oral) 18 05/30/2017 (Exact Date) 98%      MyChart Information     [employer] gives you secure access to your electronic health record. If you see a primary care provider, you can also send messages to your care team and make appointments. If you have questions, please call your primary care clinic.  If you do not have a primary care provider, please call [phone] and they will assist you.        Care EveryWhere ID     This is your Care EveryWhere ID. This could be used by other organizations to access your [facility] medical records  [id number]        Equal Access to Services     CRISTIANE TAYLOR AH: Hadkorin fuo Soandres, waaxda luqadaha, qaybta kaalmada adeegyada, angela zepeda. So [facility] [phone].    ATENCIÓN: Si habla español, tiene a ludwig disposición servicios gratuitos de asistencia lingüística. Llame al [phone].    We comply with applicable federal civil rights laws and [place] laws. We do not discriminate on the basis of race, color, national origin, age, disability, sex, sexual orientation, or gender identity.               Review of your medicines      START taking        Dose / Directions    acetaminophen 325 MG tablet    Commonly known as:  TYLENOL        Dose:  650 mg   Take 2 tablets (650 mg) by mouth every 4 hours as needed for mild pain or fever (greater than or equal to 38? C /100.4? F (oral) or 38.5? C/ 101.4? F (core).)   Quantity:  100 tablet   Refills:  0       ibuprofen 600 MG tablet   Commonly known as:  ADVIL/MOTRIN        Dose:  600 mg   Take 1 tablet (600 mg) by mouth every 6 hours as needed for moderate pain   Quantity:  40 tablet   Refills:  0       senna-docusate 8.6-50 MG per tablet   Commonly known as:  SENOKOT-S;PERICOLACE        Dose:  1 tablet   Take 1 tablet by mouth 2 times daily as needed for constipation   Quantity:  100 tablet   Refills:  1         CONTINUE these medicines which have NOT CHANGED        Dose / Directions    HYDROCORTISONE PO        Refills:  0       ketoconazole 2 % cream   Commonly known as:  NIZORAL        Apply topically daily   Refills:  0       prenatal multivitamin plus iron 27-0.8 MG Tabs per tablet   Used for:  Supervision of normal first pregnancy        Dose:  1 tablet   Take 1 tablet by mouth daily   Quantity:  100 tablet   Refills:  3       VITAMIN D-3 PO        Dose:  2000 Int'l Units   Take 2,000 Int'l Units by mouth   Refills:  0            Where to get your medicines      These medications were sent to [facility] [place], [place] - [address]  [address] [address]     Phone:  [phone]     acetaminophen 325 MG tablet    ibuprofen 600 MG tablet    senna-docusate 8.6-50 MG per tablet                Protect others around you: Learn how to safely use, store and throw away your medicines at www.disposemymeds.org.             Medication List: This is a list of all your medications and when to take them. Check marks below indicate your daily home schedule. Keep this list as a reference.      Medications           Morning Afternoon Evening Bedtime As Needed    acetaminophen 325 MG tablet   Commonly known as:  TYLENOL   Take 2  tablets (650 mg) by mouth every 4 hours as needed for mild pain or fever (greater than or equal to 38? C /100.4? F (oral) or 38.5? C/ 101.4? F (core).)   Last time this was given:  650 mg on 3/13/2018  8:47 AM                                HYDROCORTISONE PO                                ibuprofen 600 MG tablet   Commonly known as:  ADVIL/MOTRIN   Take 1 tablet (600 mg) by mouth every 6 hours as needed for moderate pain   Last time this was given:  800 mg on 3/13/2018  8:47 AM                                ketoconazole 2 % cream   Commonly known as:  NIZORAL   Apply topically daily                                prenatal multivitamin plus iron 27-0.8 MG Tabs per tablet   Take 1 tablet by mouth daily                                senna-docusate 8.6-50 MG per tablet   Commonly known as:  SENOKOT-S;PERICOLACE   Take 1 tablet by mouth 2 times daily as needed for constipation   Last time this was given:  2 tablets on 3/13/2018  8:47 AM                                VITAMIN D-3 PO   Take 2,000 Int'l Units by mouth

## 2018-03-09 NOTE — H&P
[facility]  OB History and Physical      Henrik Fletcher MRN# [id number]   Age: 28 year old YOB: 1990     CC:  IOL due low WAYNE    HPI:  Ms. Henrik Fletcher is a 28 year old  at 40w3d by LMP, who presents for IOL due to low WAYNE today. She is accompanied by partner who is at the bedside and supportive.  She denies contractions, vaginal bleeding, and loss of fluid.   + normal fetal movement.     Pregnancy Complications:  - oligohydramnios  - GBS positive      Prenatal Labs:   Lab Results   Component Value Date    ABO O 2017    RH  Pos 2017    AS Neg 2017    HEPBANG Nonreactive 2017    TREPAB Negative 2017    HGB 12.4 2017       GBS Status:   Lab Results   Component Value Date    GBS Positive (A) 2018     OB History  Obstetric History       T0      L0     SAB0   TAB0   Ectopic0   Multiple0   Live Births0       # Outcome Date GA Lbr Syed/2nd Weight Sex Delivery Anes PTL Lv   1 Current                   PMHx:   Past Medical History:   Diagnosis Date     NO ACTIVE PROBLEMS      PSHx:   Past Surgical History:   Procedure Laterality Date     NO HISTORY OF SURGERY       Meds:  No current outpatient prescriptions on file.      Allergies:  No Known Allergies   FmHx: No family history on file.      ROS:   Complete 10-point ROS negative except as noted in HPI.She denies headache, blurry vision, chest pain, shortness of breath, RUQ pain, nausea, vomiting, dysuria, hematuria or extremity edema.    PE:  Vit:   Patient Vitals for the past 4 hrs:   BP Temp Temp src   18 1456 121/79 98.1  F (36.7  C) Oral      Gen: Well-appearing, NAD, comfortable   CV: rrr, no mrg   Pulm: Ctab, no wheezes or crackles   Abd: Soft, gravid, non-tender  Ext: trace LE edema b/l  Cx: FT/Long/High    Pres:  ceph by BSUS  EFW:  7.5# by leopolds  Memb: intact              FHT: Baseline 130, moderate variability, no accelerations, no  decelerations   [place]: 0 contractions in 10 minutes      Assessment  Ms. Henrik Fletcher is a 28 year old , at 40w3d by LMP, who presents for IOL due to oligohydramnios.    Plan  Admit to L&D  IOL:  - cervical ripening with misoprostol  - admit labs: cbc, rpr, and type and screen  - GBS positive, PCN in labor    FWB:   - Cegory 1 FHT.  Continue EFM and toco    PNC:   - Rh positive, Rubella immune, GBS postive, GCT 66, Placenta posterior      # Pain Assessment:   Current Pain Score 3/5/2018   Patient currently in pain? denies   Henrik s pain level was assessed and she currently denies pain.        The patient was discussed with Dr. Shah who is in agreement with the treatment plan.    Laure Champagne MD PhD  Ob/Gyn, PGY-2  3/9/2018, 2:52 PM    Physician Attestation   I, Kristine Shah, personally examined and evaluated this patient.  I discussed the patient with the resident and care team, and agree with the assessment and plan of care as documented in the resident s note of 18  [date].      I personally reviewed vital signs, medications, labs and fetal heart rate tracing.    Key findings: Discussed induction process, methods utilized in induction. Proceed with misoprostol.   Kristine Shah  Date of Service (when I saw the patient): 18

## 2018-03-09 NOTE — PROGRESS NOTES
"Please see \"Imaging\" tab under \"Chart Review\" for details of today's US at the [facility].    Víctor Boss MD  Maternal-Fetal Medicine      "

## 2018-03-09 NOTE — MR AVS SNAPSHOT
After Visit Summary   3/9/2018    Henrik Fletcher    MRN: [id number]           Patient Information     Date Of Birth          1990        Visit Information        Provider Department      3/9/2018 10:00 AM Samreen Fox MD; [facility]        Today's Diagnoses     Normal first pregnancy in third trimester    -  1    IUGR (intrauterine growth restriction) affecting care of mother, third trimester, fetus 1           Follow-ups after your visit        Who to contact     If you have questions or need follow up information about today's clinic visit or your schedule please contact [facility] directly at [phone].  Normal or non-critical lab and imaging results will be communicated to you by MyChart, letter or phone within 4 business days after the clinic has received the results. If you do not hear from us within 7 days, please contact the clinic through [employer] or phone. If you have a critical or abnormal lab result, we will notify you by phone as soon as possible.  Submit refill requests through [employer] or call your pharmacy and they will forward the refill request to us. Please allow 3 business days for your refill to be completed.          Additional Information About Your Visit        MyChart Information     [employer] gives you secure access to your electronic health record. If you see a primary care provider, you can also send messages to your care team and make appointments. If you have questions, please call your primary care clinic.  If you do not have a primary care provider, please call [phone] and they will assist you.        Care EveryWhere ID     This is your Care EveryWhere ID. This could be used by other organizations to access your [facility] medical records  [id number]        Your Vitals Were     Pulse Temperature Last Period BMI (Body Mass Index)          83 97.8  F (36.6  C) (Oral) 05/30/2017 (Exact  Date) 30.84 kg/m2         Blood Pressure from Last 3 Encounters:   03/09/18 121/79   03/05/18 123/78   03/05/18 124/75    Weight from Last 3 Encounters:   03/09/18 191 lb (86.6 kg)   03/05/18 191 lb 14.4 oz (87 kg)   03/05/18 191 lb 14.4 oz (87 kg)              Today, you had the following     No orders found for display       Primary Care Provider Fax #    Physician No Ref-Primary [phone]       No address on file        Equal Access to Services     VINCE TAYLOR : Hadii rustam Bee, wamónica luqadaha, qaybta kaalmagato ayon, angela ha . So [facility] [phone].    ATENCIÓN: Si habla español, tiene a ludwig disposición servicios gratuitos de asistencia lingüística. Llame al [phone].    We comply with applicable federal civil rights laws and [place] laws. We do not discriminate on the basis of race, color, national origin, age, disability, sex, sexual orientation, or gender identity.            Thank you!     Thank you for choosing [facility]  for your care. Our goal is always to provide you with excellent care. Hearing back from our patients is one way we can continue to improve our services. Please take a few minutes to complete the written survey that you may receive in the mail after your visit with us. Thank you!             Your Updated Medication List - Protect others around you: Learn how to safely use, store and throw away your medicines at www.disposemymeds.org.          This list is accurate as of 3/9/18 10:53 AM.  Always use your most recent med list.                   Brand Name Dispense Instructions for use Diagnosis    HYDROCORTISONE PO           ketoconazole 2 % cream    NIZORAL     Apply topically daily        prenatal multivitamin plus iron 27-0.8 MG Tabs per tablet     100 tablet    Take 1 tablet by mouth daily    Supervision of normal first pregnancy       VITAMIN D-3 PO      Take 2,000 Int'l Units by mouth

## 2018-03-09 NOTE — PROGRESS NOTES
40w3d  Active fetal movement. No leaking or bleeding. No contractions. Getting BPPs for poor fetal growth. EFW 3/5 3121g (24th%).  BPP today showed low fluid 5.8cm but still 8/8. [facility] recommends IOL, [facility]. Cervix fingertip last week in clinic, did not recheck cervix but discussed likely need for cervical ripening and that process can take multiple days.   Due to language barrier, an  was present during the history-taking and subsequent discussion (and for part of the physical exam) with this patient.   Samreen Fox MD

## 2018-03-09 NOTE — MR AVS SNAPSHOT
After Visit Summary   3/9/2018    Henrik Fletcher    MRN: [id number]           Patient Information     Date Of Birth          1990        Visit Information        Provider Department      3/9/2018 10:00 AM Víctor Boss MD [facility] Maternal Fetal Medicine [facility]        Today's Diagnoses     Post-term pregnancy, 40-42 weeks of gestation    -  1       Follow-ups after your visit        Who to contact     If you have questions or need follow up information about today's clinic visit or your schedule please contact [facility] directly at [phone].  Normal or non-critical lab and imaging results will be communicated to you by MyChart, letter or phone within 4 business days after the clinic has received the results. If you do not hear from us within 7 days, please contact the clinic through [employer] or phone. If you have a critical or abnormal lab result, we will notify you by phone as soon as possible.  Submit refill requests through [employer] or call your pharmacy and they will forward the refill request to us. Please allow 3 business days for your refill to be completed.          Additional Information About Your Visit        MyChart Information     [employer] gives you secure access to your electronic health record. If you see a primary care provider, you can also send messages to your care team and make appointments. If you have questions, please call your primary care clinic.  If you do not have a primary care provider, please call [phone] and they will assist you.        Care EveryWhere ID     This is your Care EveryWhere ID. This could be used by other organizations to access your [place] medical records  [id number]        Your Vitals Were     Last Period                   05/30/2017 (Exact Date)            Blood Pressure from Last 3 Encounters:   03/05/18 123/78   03/05/18 124/75   02/26/18 116/84    Weight from Last 3 Encounters:    03/05/18 87 kg (191 lb 14.4 oz)   03/05/18 87 kg (191 lb 14.4 oz)   02/26/18 89.1 kg (196 lb 6.4 oz)              Today, you had the following     No orders found for display       Primary Care Provider Fax #    Physician No Ref-Primary [phone]       No address on file        Equal Access to Services     [facility]: Hadii aad ku hadasho Soomaali, waaxda luqadaha, qaybta kaalmada adeegyada, angela haywoodin jamesn adeeg constantin laNanikayy . So [facility] [phone].    ATENCIÓN: Si habla español, tiene a ludwig disposición servicios gratuitos de asistencia lingüística. Rocame al [phone].    We comply with applicable federal civil rights laws and [place] laws. We do not discriminate on the basis of race, color, national origin, age, disability, sex, sexual orientation, or gender identity.            Thank you!     Thank you for choosing [facility]  for your care. Our goal is always to provide you with excellent care. Hearing back from our patients is one way we can continue to improve our services. Please take a few minutes to complete the written survey that you may receive in the mail after your visit with us. Thank you!             Your Updated Medication List - Protect others around you: Learn how to safely use, store and throw away your medicines at www.disposemymeds.org.          This list is accurate as of 3/9/18 10:24 AM.  Always use your most recent med list.                   Brand Name Dispense Instructions for use Diagnosis    HYDROCORTISONE PO           ketoconazole 2 % cream    NIZORAL     Apply topically daily        prenatal multivitamin plus iron 27-0.8 MG Tabs per tablet     100 tablet    Take 1 tablet by mouth daily    Supervision of normal first pregnancy       VITAMIN D-3 PO      Take 2,000 Int'l Units by mouth

## 2018-03-09 NOTE — IP AVS SNAPSHOT
[facility]    [address] [address]    Phone:  [phone]                                       After Visit Summary   3/9/2018    Henrik Fletcher    MRN: [id number]           After Visit Summary Signature Page     I have received my discharge instructions, and my questions have been answered. I have discussed any challenges I see with this plan with the nurse or doctor.    ..........................................................................................................................................  Patient/Patient Representative Signature      ..........................................................................................................................................  Patient Representative Print Name and Relationship to Patient    ..................................................               ................................................  Date                                            Time    ..........................................................................................................................................  Reviewed by Signature/Title    ...................................................              ..............................................  Date                                                            Time

## 2018-03-10 LAB — T PALLIDUM IGG+IGM SER QL: NEGATIVE

## 2018-03-10 PROCEDURE — 25000132 ZZH RX MED GY IP 250 OP 250 PS 637: Performed by: OBSTETRICS & GYNECOLOGY

## 2018-03-10 PROCEDURE — 25000128 H RX IP 250 OP 636: Performed by: OBSTETRICS & GYNECOLOGY

## 2018-03-10 PROCEDURE — 25000132 ZZH RX MED GY IP 250 OP 250 PS 637: Performed by: STUDENT IN AN ORGANIZED HEALTH CARE EDUCATION/TRAINING PROGRAM

## 2018-03-10 PROCEDURE — 59200 INSERT CERVICAL DILATOR: CPT | Performed by: OBSTETRICS & GYNECOLOGY

## 2018-03-10 PROCEDURE — 25000128 H RX IP 250 OP 636

## 2018-03-10 PROCEDURE — 12000032 ZZH R&B OB CRITICAL UMMC

## 2018-03-10 PROCEDURE — 3E0P7VZ INTRODUCTION OF HORMONE INTO FEMALE REPRODUCTIVE, VIA NATURAL OR ARTIFICIAL OPENING: ICD-10-PCS | Performed by: OBSTETRICS & GYNECOLOGY

## 2018-03-10 RX ORDER — MORPHINE SULFATE 4 MG/ML
10 INJECTION, SOLUTION INTRAMUSCULAR; INTRAVENOUS ONCE
Status: DISCONTINUED | OUTPATIENT
Start: 2018-03-10 | End: 2018-03-10 | Stop reason: CLARIF

## 2018-03-10 RX ORDER — HYDROXYZINE HYDROCHLORIDE 50 MG/1
100 TABLET, FILM COATED ORAL ONCE
Status: COMPLETED | OUTPATIENT
Start: 2018-03-10 | End: 2018-03-10

## 2018-03-10 RX ORDER — MORPHINE SULFATE 10 MG/ML
10 INJECTION, SOLUTION INTRAMUSCULAR; INTRAVENOUS ONCE
Status: DISCONTINUED | OUTPATIENT
Start: 2018-03-10 | End: 2018-03-10

## 2018-03-10 RX ORDER — FENTANYL CITRATE 50 UG/ML
50-100 INJECTION, SOLUTION INTRAMUSCULAR; INTRAVENOUS
Status: DISCONTINUED | OUTPATIENT
Start: 2018-03-10 | End: 2018-03-10

## 2018-03-10 RX ORDER — MORPHINE SULFATE 10 MG/ML
10 INJECTION, SOLUTION INTRAMUSCULAR; INTRAVENOUS ONCE
Status: DISCONTINUED | OUTPATIENT
Start: 2018-03-10 | End: 2018-03-10 | Stop reason: CLARIF

## 2018-03-10 RX ORDER — FENTANYL CITRATE 50 UG/ML
INJECTION, SOLUTION INTRAMUSCULAR; INTRAVENOUS
Status: COMPLETED
Start: 2018-03-10 | End: 2018-03-10

## 2018-03-10 RX ORDER — MORPHINE SULFATE 4 MG/ML
10 INJECTION, SOLUTION INTRAMUSCULAR; INTRAVENOUS ONCE
Status: COMPLETED | OUTPATIENT
Start: 2018-03-10 | End: 2018-03-10

## 2018-03-10 RX ADMIN — DINOPROSTONE 10 MG: 10 INSERT VAGINAL at 14:44

## 2018-03-10 RX ADMIN — MORPHINE SULFATE 10 MG: 4 INJECTION, SOLUTION INTRAMUSCULAR; INTRAVENOUS at 19:58

## 2018-03-10 RX ADMIN — HYDROXYZINE HYDROCHLORIDE 100 MG: 50 TABLET, FILM COATED ORAL at 19:48

## 2018-03-10 RX ADMIN — FENTANYL CITRATE 100 MCG: 50 INJECTION, SOLUTION INTRAMUSCULAR; INTRAVENOUS at 17:57

## 2018-03-10 RX ADMIN — Medication 25 MCG: at 00:55

## 2018-03-10 RX ADMIN — Medication 25 MCG: at 05:09

## 2018-03-10 NOTE — PLAN OF CARE
Problem: Patient Care Overview  Goal: Plan of Care/Patient Progress Review  Outcome: No Change  Induction of Labor Admit Note  Henrik Fletcher  MRN: [id number]  Gestational Age: 40w3d      Henrik Fletcher presents for induction at of labor for low marily at 1500 today.  Patient denies contractions, bleeding or ROM.    Past Medical History:   Diagnosis Date     NO ACTIVE PROBLEMS      Dr. Shah and Dr. Champagne notified of patient's arrival and condition.  Patient seen by Dr. Champagne and cervix examed.  Oriented patient to surroundings. Call light within reach.     Plan:   -cervical ripening with oral miso.

## 2018-03-10 NOTE — PLAN OF CARE
Problem: Patient Care Overview  Goal: Plan of Care/Patient Progress Review  Outcome: No Change  Pt alert, active, and stable. No signs or symptoms of distress. VSS. Pt able to rest through majority of the night. No questions or concerns. See flowsheet for FHR interpretation. Plan is to continue with oral miso.

## 2018-03-10 NOTE — PROGRESS NOTES
Intrapartum Progress Note    S: Patient reports that she is feeling fine. She had some cramping, but is not currently having painful contractions.      O:   Patient Vitals for the past 24 hrs:   BP Temp Temp src Pulse Resp   03/10/18 1200 121/62 98  F (36.7  C) Oral 59 -   03/10/18 1000 126/86 - - - -   03/10/18 0730 120/79 98.8  F (37.1  C) Oral - -   03/10/18 0504 113/75 98.2  F (36.8  C) - - -   03/10/18 0401 119/68 - - - -   03/10/18 0200 119/63 - - - -   03/10/18 0055 119/79 - - - -   18 2344 109/71 97.9  F (36.6  C) Oral - 18   18 2242 119/75 - - - -   18 2151 115/72 - - - -   18 2049 116/73 - - - -   18 1944 108/74 - - - 18   18 1840 120/81 97.9  F (36.6  C) Oral - 18   18 1730 113/73 - - - -   18 1630 127/79 98.2  F (36.8  C) Oral - 18   18 1456 121/79 98.1  F (36.7  C) Oral - -   ]  Gen: sitting up in bed, appears comfortable  SVE: 2.5/50/-2/medium/posterior 1419     Patient's cervix has not changed.  She has now expelled 2 cook catheters without change.  One placed digitally by myself and one placed with speculum exam by Dr. Hooper    FHT:   Baseline 135  Variability moderate  Accels Present  Decels Absent     [place]: 1-2 contractions in 10 min    Membranes: intact    A/P: Ms. Henrik Fletcher is a 28 year old  at 40w4d by LMP, here for induction of labor for oligohydramnios at term. Her pregnancy is complicated by GBS colonization.     - Labor                         - Admit to L&D for IOL d/t oligohydramnios                         - PNC: Rh positive. Rubella immune. GBS positive. PCN in active labor.                          - Fen/GI: Clear liquid diet, IVF                         - Last SVE: /-3, Membranes: intact                         - Plan: S/p cytotec x2, expelled 2 cook catheters without cervical change.  Will start cervidil now.                         - Pain management: per patient request     -Fetal Well Being                          - Category I FHT. Reactive and reassuring                         - Cephalic by BSUS. EFW 7.5Ib                         - Continue EFM and [place]    Amalia Pena MD   OBGYN PGY3

## 2018-03-10 NOTE — PROGRESS NOTES
Late entry from 1130    Patient had max doses of misoprostol, cervix 1.5/50/-3.  Cook catheter placed via speculum with direct visualization of balloon and placement.  Digital exam after inflation of uterine balloon confirmed placement as well.  60cc placed in both uterine and vaginal balloon.  Patient tolerated the procedure well.  [facility] cat 1.    TOPHER CRUZ MD

## 2018-03-10 NOTE — PROGRESS NOTES
Resting comfortably.  Was fairly crampy immediately after cook placement, but better now.  t cat 1    TOPHER CRUZ MD

## 2018-03-10 NOTE — PROGRESS NOTES
[facility]  Labor Progress Note    Subjective:  Patient resting comfortably in bed.    Objective:   Patient Vitals for the past 4 hrs:   BP   03/10/18 0401 119/68   03/10/18 0200 119/63     Last SVE: 1.5/50/-3    FHT: Baseline 130, moderate variability, accelerations present, no deceleration  [place]: Uterine irritability, 2 in 10 minutes    Assessment/Plan:  Ms. Henrik Fletcher is a 28 year old  at 40w4d by LMP, here for induction of labor for oligohydramnios at term. Her pregnancy is complicated by GBS colonization.    - Labor   - Admit to L&D for IOL d/t oligohydramnios   - PNC: Rh positive. Rubella immune. GBS positive. PCN in active labor.    - Fen/GI: Clear liquid diet, IVF   - Last SVE: 1.5/50/-3, Membranes: intact   - Plan: S/p Cytotec x 5. Due for 6th now. Repeat check at next dose.   - Pain management: per patient request    -Fetal Well Being   - Category I FHT. Reactive and reassuring   - Cephalic by BSUS. EFW 7.5Ib   - Continue EFM and [place]    Alicia Bird MD  OB/GYN Resident, PGY-3  3/10/2018 5:05 AM

## 2018-03-10 NOTE — PLAN OF CARE
Problem: Labor (Cervical Ripen, Induct, Augment) (Adult,Obstetrics,Pediatric)  Goal: Signs and Symptoms of Listed Potential Problems Will be Absent, Minimized or Managed (Labor)  Signs and symptoms of listed potential problems will be absent, minimized or managed by discharge/transition of care (reference Labor (Cervical Ripen, Induct, Augment) (Adult,Obstetrics,Pediatric) CPG).   After multiple doses of oral miso Cook Cath was placed at 1130. FHT Cat 1. Pt comfortable after repositioning. Continue to monitor closely.

## 2018-03-10 NOTE — PLAN OF CARE
Problem: Patient Care Overview  Goal: Plan of Care/Patient Progress Review  Outcome: No Change   here with patient.  Discussed plan of care for tonight with patient and spouse using .  Patient and spouse verbalized understanding of the plan of care and are comfortable with cares without keeping  in the hospital at this time.  Discussed ipad  use if needed during the night.

## 2018-03-10 NOTE — PLAN OF CARE
Problem: Labor (Cervical Ripen, Induct, Augment) (Adult,Obstetrics,Pediatric)  Goal: Signs and Symptoms of Listed Potential Problems Will be Absent, Minimized or Managed (Labor)  Signs and symptoms of listed potential problems will be absent, minimized or managed by discharge/transition of care (reference Labor (Cervical Ripen, Induct, Augment) (Adult,Obstetrics,Pediatric) CPG).   Pt called out that 2nd Cook Cath placed today fell out. SVE found pt to be unchanged (/-3) Plan for application of cervidil when available from pharmacy. Expect .

## 2018-03-10 NOTE — PLAN OF CARE
"Problem: Patient Care Overview  Goal: Plan of Care/Patient Progress Review  4th dose of misoprostol administered at 2240. VSS afebrile. Denies LOF, VB. 2-3 ctxs reported per hour felt as \"tightening.\" FHR cat I. Dr. Bird plans to recheck cervix before 5th dose of miso due at 1240.  at bedside for support. Will continue to monitor and update provider as appropriate.       "

## 2018-03-10 NOTE — PROGRESS NOTES
[facility]  Labor Progress Note    Subjective:  Patient resting comfortably in bed. Sleeping comfortably    Objective:   Patient Vitals for the past 4 hrs:   BP Temp Temp src Resp   03/10/18 0055 119/79 - - -   18 2344 109/71 97.9  F (36.6  C) Oral 18   18 2242 119/75 - - -   18 2151 115/72 - - -     SVE: 1.5/50/-3    FHT: Baseline 150, moderate variability, accelerations present, rare spontaneous deceleration  [place]: Uterine irritability, 2 in 10 minutes    Assessment/Plan:  Ms. Henrik Fletcher is a 28 year old  at 40w4d by LMP, here for induction of labor for oligohydramnios at term. Her pregnancy is complicated by GBS colonization.    - Labor   - Admit to L&D for IOL d/t oligohydramnios   - PNC: Rh positive. Rubella immune. GBS positive. PCN in active labor.    - Fen/GI: Clear liquid diet, IVF   - SVE: 1.5/50/-3, Membranes: intact   - Plan: S/p Cytotec x 3. Continue with further cervical ripening.   - Pain management: per patient request    -Fetal Well Being   - Category I FHT. Reactive and reassuring   - Cephalic by BSUS. EFW 7.5Ib   - Continue EFM and [place]    Alicia Bird MD  OB/GYN Resident, PGY-3  3/10/2018 1:17 AM

## 2018-03-10 NOTE — PROGRESS NOTES
[facility]  Labor Progress Note    Subjective:  Patient resting comfortably in bed. Endorses continued contractions    Objective:   Patient Vitals for the past 4 hrs:   BP Resp   18 2242 119/75 -   18 2151 115/72 -   18 2049 116/73 -   18 1944 108/74 18       FHT: Baseline 130, moderate variability, accelerations present, no decelerations  [place]: 1-3 contractions in 10 minutes    Assessment/Plan:  Ms. Henrik Fletcher is a 28 year old  at 40w3d by LMP, here for induction of labor for oligohydramnios at term. Her pregnancy is complicated by GBS colonization.    - Labor   - Admit to L&D for IOL d/t oligohydramnios   - PNC: Rh positive. Rubella immune. GBS positive. PCN in active labor.    - Fen/GI: Clear liquid diet, IVF   - Plan: Continue cervical ripening with cytotec   - Pain management: per patient request    -Fetal Well Being   - Category I FHT. Reactive and reassuring   - Cephalic by BSUS. EFW 7.5Ib   - Continue EFM and [place]    Alicia Bird MD  OB/GYN Resident, PGY-3  3/9/2018 0806 PM

## 2018-03-11 ENCOUNTER — ANESTHESIA (OUTPATIENT)
Dept: OBGYN | Facility: CLINIC | Age: 28
End: 2018-03-11
Payer: COMMERCIAL

## 2018-03-11 ENCOUNTER — ANESTHESIA EVENT (OUTPATIENT)
Dept: OBGYN | Facility: CLINIC | Age: 28
End: 2018-03-11
Payer: COMMERCIAL

## 2018-03-11 PROCEDURE — 25000125 ZZHC RX 250: Performed by: STUDENT IN AN ORGANIZED HEALTH CARE EDUCATION/TRAINING PROGRAM

## 2018-03-11 PROCEDURE — 0KQM0ZZ REPAIR PERINEUM MUSCLE, OPEN APPROACH: ICD-10-PCS | Performed by: OBSTETRICS & GYNECOLOGY

## 2018-03-11 PROCEDURE — 88307 TISSUE EXAM BY PATHOLOGIST: CPT | Mod: 26 | Performed by: OBSTETRICS & GYNECOLOGY

## 2018-03-11 PROCEDURE — 25000125 ZZHC RX 250

## 2018-03-11 PROCEDURE — 59400 OBSTETRICAL CARE: CPT | Mod: GC | Performed by: OBSTETRICS & GYNECOLOGY

## 2018-03-11 PROCEDURE — 00HU33Z INSERTION OF INFUSION DEVICE INTO SPINAL CANAL, PERCUTANEOUS APPROACH: ICD-10-PCS | Performed by: STUDENT IN AN ORGANIZED HEALTH CARE EDUCATION/TRAINING PROGRAM

## 2018-03-11 PROCEDURE — 25000125 ZZHC RX 250: Performed by: OBSTETRICS & GYNECOLOGY

## 2018-03-11 PROCEDURE — 25000128 H RX IP 250 OP 636: Performed by: STUDENT IN AN ORGANIZED HEALTH CARE EDUCATION/TRAINING PROGRAM

## 2018-03-11 PROCEDURE — 72200001 ZZH LABOR CARE VAGINAL DELIVERY SINGLE

## 2018-03-11 PROCEDURE — 25000132 ZZH RX MED GY IP 250 OP 250 PS 637

## 2018-03-11 PROCEDURE — 3E0R3BZ INTRODUCTION OF ANESTHETIC AGENT INTO SPINAL CANAL, PERCUTANEOUS APPROACH: ICD-10-PCS | Performed by: STUDENT IN AN ORGANIZED HEALTH CARE EDUCATION/TRAINING PROGRAM

## 2018-03-11 PROCEDURE — 25000128 H RX IP 250 OP 636: Performed by: OBSTETRICS & GYNECOLOGY

## 2018-03-11 PROCEDURE — 88307 TISSUE EXAM BY PATHOLOGIST: CPT | Performed by: OBSTETRICS & GYNECOLOGY

## 2018-03-11 PROCEDURE — 25000132 ZZH RX MED GY IP 250 OP 250 PS 637: Performed by: OBSTETRICS & GYNECOLOGY

## 2018-03-11 PROCEDURE — 12000032 ZZH R&B OB CRITICAL UMMC

## 2018-03-11 RX ORDER — OXYTOCIN 10 [USP'U]/ML
10 INJECTION, SOLUTION INTRAMUSCULAR; INTRAVENOUS
Status: DISCONTINUED | OUTPATIENT
Start: 2018-03-11 | End: 2018-03-13 | Stop reason: HOSPADM

## 2018-03-11 RX ORDER — NALBUPHINE HYDROCHLORIDE 10 MG/ML
2.5-5 INJECTION, SOLUTION INTRAMUSCULAR; INTRAVENOUS; SUBCUTANEOUS EVERY 6 HOURS PRN
Status: DISCONTINUED | OUTPATIENT
Start: 2018-03-11 | End: 2018-03-11

## 2018-03-11 RX ORDER — LIDOCAINE 40 MG/G
CREAM TOPICAL
Status: DISCONTINUED | OUTPATIENT
Start: 2018-03-11 | End: 2018-03-11

## 2018-03-11 RX ORDER — BISACODYL 10 MG
10 SUPPOSITORY, RECTAL RECTAL DAILY PRN
Status: DISCONTINUED | OUTPATIENT
Start: 2018-03-13 | End: 2018-03-13 | Stop reason: HOSPADM

## 2018-03-11 RX ORDER — LIDOCAINE HYDROCHLORIDE AND EPINEPHRINE 15; 5 MG/ML; UG/ML
INJECTION, SOLUTION EPIDURAL PRN
Status: DISCONTINUED | OUTPATIENT
Start: 2018-03-11 | End: 2018-12-11 | Stop reason: HOSPADM

## 2018-03-11 RX ORDER — MISOPROSTOL 200 UG/1
TABLET ORAL
Status: COMPLETED
Start: 2018-03-11 | End: 2018-03-11

## 2018-03-11 RX ORDER — MISOPROSTOL 200 UG/1
400 TABLET ORAL
Status: DISCONTINUED | OUTPATIENT
Start: 2018-03-11 | End: 2018-03-13 | Stop reason: HOSPADM

## 2018-03-11 RX ORDER — ACETAMINOPHEN 325 MG/1
650 TABLET ORAL EVERY 4 HOURS PRN
Status: DISCONTINUED | OUTPATIENT
Start: 2018-03-11 | End: 2018-03-13 | Stop reason: HOSPADM

## 2018-03-11 RX ORDER — EPHEDRINE SULFATE 50 MG/ML
5 INJECTION, SOLUTION INTRAMUSCULAR; INTRAVENOUS; SUBCUTANEOUS
Status: DISCONTINUED | OUTPATIENT
Start: 2018-03-11 | End: 2018-03-11

## 2018-03-11 RX ORDER — OXYTOCIN/0.9 % SODIUM CHLORIDE 30/500 ML
100 PLASTIC BAG, INJECTION (ML) INTRAVENOUS CONTINUOUS
Status: DISCONTINUED | OUTPATIENT
Start: 2018-03-11 | End: 2018-03-13 | Stop reason: HOSPADM

## 2018-03-11 RX ORDER — OXYTOCIN/0.9 % SODIUM CHLORIDE 30/500 ML
340 PLASTIC BAG, INJECTION (ML) INTRAVENOUS CONTINUOUS PRN
Status: DISCONTINUED | OUTPATIENT
Start: 2018-03-11 | End: 2018-03-13 | Stop reason: HOSPADM

## 2018-03-11 RX ORDER — CARBOPROST TROMETHAMINE 250 UG/ML
250 INJECTION, SOLUTION INTRAMUSCULAR
Status: DISCONTINUED | OUTPATIENT
Start: 2018-03-11 | End: 2018-03-13 | Stop reason: HOSPADM

## 2018-03-11 RX ORDER — IBUPROFEN 600 MG/1
600 TABLET, FILM COATED ORAL EVERY 6 HOURS PRN
Qty: 40 TABLET | Refills: 0 | Status: SHIPPED | OUTPATIENT
Start: 2018-03-11 | End: 2018-04-30

## 2018-03-11 RX ORDER — CLINDAMYCIN PHOSPHATE 900 MG/50ML
900 INJECTION, SOLUTION INTRAVENOUS EVERY 8 HOURS
Status: DISCONTINUED | OUTPATIENT
Start: 2018-03-11 | End: 2018-03-12

## 2018-03-11 RX ORDER — ACETAMINOPHEN 325 MG/1
650 TABLET ORAL EVERY 4 HOURS PRN
Qty: 100 TABLET | Refills: 0 | Status: SHIPPED | OUTPATIENT
Start: 2018-03-11 | End: 2018-04-30

## 2018-03-11 RX ORDER — OXYTOCIN 10 [USP'U]/ML
INJECTION, SOLUTION INTRAMUSCULAR; INTRAVENOUS
Status: DISCONTINUED
Start: 2018-03-11 | End: 2018-03-11 | Stop reason: WASHOUT

## 2018-03-11 RX ORDER — OXYTOCIN/0.9 % SODIUM CHLORIDE 30/500 ML
PLASTIC BAG, INJECTION (ML) INTRAVENOUS
Status: COMPLETED
Start: 2018-03-11 | End: 2018-03-11

## 2018-03-11 RX ORDER — AMOXICILLIN 250 MG
2 CAPSULE ORAL 2 TIMES DAILY PRN
Status: DISCONTINUED | OUTPATIENT
Start: 2018-03-11 | End: 2018-03-13 | Stop reason: HOSPADM

## 2018-03-11 RX ORDER — AMOXICILLIN 250 MG
1 CAPSULE ORAL 2 TIMES DAILY PRN
Status: DISCONTINUED | OUTPATIENT
Start: 2018-03-11 | End: 2018-03-13 | Stop reason: HOSPADM

## 2018-03-11 RX ORDER — IBUPROFEN 800 MG/1
800 TABLET, FILM COATED ORAL EVERY 6 HOURS PRN
Status: DISCONTINUED | OUTPATIENT
Start: 2018-03-11 | End: 2018-03-13 | Stop reason: HOSPADM

## 2018-03-11 RX ORDER — AMOXICILLIN 250 MG
1 CAPSULE ORAL 2 TIMES DAILY PRN
Qty: 100 TABLET | Refills: 1 | Status: SHIPPED | OUTPATIENT
Start: 2018-03-11 | End: 2018-04-30

## 2018-03-11 RX ORDER — METHYLERGONOVINE MALEATE 0.2 MG/ML
200 INJECTION INTRAVENOUS
Status: DISCONTINUED | OUTPATIENT
Start: 2018-03-11 | End: 2018-03-13 | Stop reason: HOSPADM

## 2018-03-11 RX ORDER — NALOXONE HYDROCHLORIDE 0.4 MG/ML
.1-.4 INJECTION, SOLUTION INTRAMUSCULAR; INTRAVENOUS; SUBCUTANEOUS
Status: DISCONTINUED | OUTPATIENT
Start: 2018-03-11 | End: 2018-03-13 | Stop reason: HOSPADM

## 2018-03-11 RX ORDER — OXYTOCIN/0.9 % SODIUM CHLORIDE 30/500 ML
1-24 PLASTIC BAG, INJECTION (ML) INTRAVENOUS CONTINUOUS
Status: DISCONTINUED | OUTPATIENT
Start: 2018-03-11 | End: 2018-03-11

## 2018-03-11 RX ORDER — LANOLIN 100 %
OINTMENT (GRAM) TOPICAL
Status: DISCONTINUED | OUTPATIENT
Start: 2018-03-11 | End: 2018-03-13 | Stop reason: HOSPADM

## 2018-03-11 RX ORDER — TERBUTALINE SULFATE 1 MG/ML
0.25 INJECTION, SOLUTION SUBCUTANEOUS
Status: DISCONTINUED | OUTPATIENT
Start: 2018-03-11 | End: 2018-03-11

## 2018-03-11 RX ORDER — NALOXONE HYDROCHLORIDE 0.4 MG/ML
.1-.4 INJECTION, SOLUTION INTRAMUSCULAR; INTRAVENOUS; SUBCUTANEOUS
Status: DISCONTINUED | OUTPATIENT
Start: 2018-03-11 | End: 2018-03-11

## 2018-03-11 RX ORDER — HYDROCORTISONE 2.5 %
CREAM (GRAM) TOPICAL 3 TIMES DAILY PRN
Status: DISCONTINUED | OUTPATIENT
Start: 2018-03-11 | End: 2018-03-13 | Stop reason: HOSPADM

## 2018-03-11 RX ORDER — LIDOCAINE HYDROCHLORIDE 10 MG/ML
INJECTION, SOLUTION EPIDURAL; INFILTRATION; INTRACAUDAL; PERINEURAL
Status: DISCONTINUED
Start: 2018-03-11 | End: 2018-03-11 | Stop reason: HOSPADM

## 2018-03-11 RX ADMIN — SODIUM CHLORIDE, POTASSIUM CHLORIDE, SODIUM LACTATE AND CALCIUM CHLORIDE: 600; 310; 30; 20 INJECTION, SOLUTION INTRAVENOUS at 04:59

## 2018-03-11 RX ADMIN — Medication 1 MILLI-UNITS/MIN: at 06:34

## 2018-03-11 RX ADMIN — Medication 2.5 MILLION UNITS: at 08:54

## 2018-03-11 RX ADMIN — SODIUM CHLORIDE, POTASSIUM CHLORIDE, SODIUM LACTATE AND CALCIUM CHLORIDE: 600; 310; 30; 20 INJECTION, SOLUTION INTRAVENOUS at 10:16

## 2018-03-11 RX ADMIN — Medication 4 ML: at 04:49

## 2018-03-11 RX ADMIN — Medication 4 ML: at 04:52

## 2018-03-11 RX ADMIN — CLINDAMYCIN PHOSPHATE 900 MG: 18 INJECTION, SOLUTION INTRAVENOUS at 16:42

## 2018-03-11 RX ADMIN — ACETAMINOPHEN 650 MG: 325 TABLET, FILM COATED ORAL at 20:48

## 2018-03-11 RX ADMIN — LIDOCAINE HYDROCHLORIDE 20 ML: 10 INJECTION, SOLUTION EPIDURAL; INFILTRATION; INTRACAUDAL; PERINEURAL at 14:14

## 2018-03-11 RX ADMIN — LIDOCAINE HYDROCHLORIDE,EPINEPHRINE BITARTRATE 2 ML: 15; .005 INJECTION, SOLUTION EPIDURAL; INFILTRATION; INTRACAUDAL; PERINEURAL at 04:46

## 2018-03-11 RX ADMIN — Medication 2.5 MILLION UNITS: at 12:59

## 2018-03-11 RX ADMIN — GENTAMICIN SULFATE 140 MG: 40 INJECTION, SOLUTION INTRAMUSCULAR; INTRAVENOUS at 17:43

## 2018-03-11 RX ADMIN — IBUPROFEN 800 MG: 800 TABLET ORAL at 18:38

## 2018-03-11 RX ADMIN — OXYTOCIN-SODIUM CHLORIDE 0.9% IV SOLN 30 UNIT/500ML 1 MILLI-UNITS/MIN: 30-0.9/5 SOLUTION at 06:34

## 2018-03-11 RX ADMIN — MISOPROSTOL 400 MCG: 200 TABLET ORAL at 14:13

## 2018-03-11 RX ADMIN — PENICILLIN G POTASSIUM 5 MILLION UNITS: 5000000 POWDER, FOR SOLUTION INTRAMUSCULAR; INTRAPLEURAL; INTRATHECAL; INTRAVENOUS at 05:00

## 2018-03-11 RX ADMIN — Medication: at 04:51

## 2018-03-11 RX ADMIN — Medication 10 ML/HR: at 04:53

## 2018-03-11 RX ADMIN — LIDOCAINE HYDROCHLORIDE,EPINEPHRINE BITARTRATE 3 ML: 15; .005 INJECTION, SOLUTION EPIDURAL; INFILTRATION; INTRACAUDAL; PERINEURAL at 04:42

## 2018-03-11 RX ADMIN — SODIUM CHLORIDE, POTASSIUM CHLORIDE, SODIUM LACTATE AND CALCIUM CHLORIDE 1000 ML: 600; 310; 30; 20 INJECTION, SOLUTION INTRAVENOUS at 04:06

## 2018-03-11 NOTE — ANESTHESIA PREPROCEDURE EVALUATION
ANESTHESIA PREOP EVALUATION    PROCEDURE: Labor epidural    HPI: Henrik Fletcher is a 28 year old female who presents for above procedure labor pain.    PAST MEDICAL HISTORY:    Past Medical History:   Diagnosis Date     NO ACTIVE PROBLEMS        PAST SURGICAL HISTORY:    Past Surgical History:   Procedure Laterality Date     NO HISTORY OF SURGERY         PAST ANESTHESIA HISTORY:     No personal or family h/o anesthesia problems    SOCIAL HISTORY:       Social History   Substance Use Topics     Smoking status: Never Smoker     Smokeless tobacco: Never Used     Alcohol use No       ALLERGIES:     No Known Allergies    MEDICATIONS:     Prescriptions Prior to Admission   Medication Sig Dispense Refill Last Dose     Cholecalciferol (VITAMIN D-3 PO) Take 2,000 Int'l Units by mouth   Past Week at Unknown time     Prenatal Vit-Fe Fumarate-FA (PRENATAL MULTIVITAMIN  PLUS IRON) 27-0.8 MG TABS per tablet Take 1 tablet by mouth daily 100 tablet 3 Past Week at Unknown time     ketoconazole (NIZORAL) 2 % cream Apply topically daily   More than a month at Unknown time     HYDROCORTISONE PO    More than a month at Unknown time       No current outpatient prescriptions on file.       Current Facility-Administered Medications Ordered in Epic   Medication Dose Route Frequency Provider Last Rate Last Dose     lidocaine 1 % 1 mL  1 mL Other Q1H PRN Amalia Pena MD         lidocaine (LMX4) kit   Topical Q1H PRN Amalia Pena MD         sodium chloride (PF) 0.9% PF flush 3 mL  3 mL Intracatheter Q1H PRAmalia Falk MD         sodium chloride (PF) 0.9% PF flush 3 mL  3 mL Intracatheter Q8H Amalia Pena MD         oxytocin (PITOCIN) 30 units in 500 mL 0.9% NaCl infusion  1-24 rosy-units/min Intravenous Continuous Amalia Pena MD         terbutaline (BRETHINE) injection 0.25 mg  0.25 mg Subcutaneous Once PRN Amalia Pena MD         medication instruction   Does not apply Continuous  "PRN Alexander Boogie MD         lactated ringers BOLUS 250 mL  250 mL Intravenous Once PRN Alexander Boogie MD         ePHEDrine injection 5 mg  5 mg Intravenous Q3 Min PRN Alexander Boogie MD         nalbuphine (NUBAIN) injection 2.5-5 mg  2.5-5 mg Intravenous Q6H PRN Alexander Boogie MD         naloxone (NARCAN) injection 0.1-0.4 mg  0.1-0.4 mg Intravenous Q2 Min PRN Alexander Boogie MD         medication instruction   Does not apply Continuous PRN Alexander Boogie MD         Opioid plan postpartum - medication instruction   Does not apply Continuous PRN Alexander Boogie MD         fentaNYL (SUBLIMAZE) 2 mcg/mL, bupivacaine (MARCAINE) 0.125% in NS premix for PCEA   EPIDURAL PCA Alexander Boogie MD         sodium chloride (PF) 0.9% PF flush 3 mL  3 mL Intracatheter Q8H Sherri Hoopre MD   3 mL at 03/10/18 2058     lactated ringers infusion   Intravenous Continuous Laure Champagne MD         lactated ringers BOLUS 500 mL  500 mL Intravenous Once PRN Laure Champagne MD         acetaminophen (TYLENOL) tablet 650 mg  650 mg Oral Q4H PRN Laure Champagne MD         naloxone (NARCAN) injection 0.1-0.4 mg  0.1-0.4 mg Intravenous Q2 Min PRN Laure Champagne MD         ondansetron (ZOFRAN) injection 4 mg  4 mg Intravenous Q6H PRN Laure Champagne MD         oxytocin (PITOCIN) injection 10 Units  10 Units Intramuscular Once PRN Laure Champagne MD         oxytocin (PITOCIN) 30 units in 500 mL 0.9% NaCl infusion  100-340 mL/hr Intravenous Continuous PRN Laure Champagne MD         Medication Instructions: misoprostol (CYTOTEC)- Nurse to discuss ordering with provider, if needed. Ordered via \"OB misoprostol (CYTOTEC) Postpartum Hemorrhage PANEL\"   Does not apply Continuous PRN Laure Champagne MD         methylergonovine (METHERGINE) injection 200 mcg  200 mcg Intramuscular Once PRN Laure Champagne MD         carboprost (HEMABATE) injection 250 mcg  250 mcg Intramuscular " Once PRN Laure Champagne MD         lidocaine 1 % 0.1-20 mL  0.1-20 mL Subcutaneous Once PRN Laure Champagne MD         ibuprofen (ADVIL/MOTRIN) tablet 800 mg  800 mg Oral Once PRN Laure Chmapagne MD         oxyCODONE-acetaminophen (PERCOCET) 5-325 MG per tablet 1 tablet  1 tablet Oral Once PRN Laure Champagne MD         nitrous oxide/oxygen 50/50 blend   Inhalation Continuous PRN Laure Champagne MD         penicillin G potassium injection 5 Million Units  5 Million Units Intravenous Once Laure Champagne MD   Stopped at 18 1636    Followed by     penicillin G potassium injection 2.5 Million Units  2.5 Million Units Intravenous Q4H Laure Champagne MD         No current [facility]-ordered outpatient prescriptions on file.       PHYSICAL EXAM:    Vitals: T 98.5, P 77, /66, R 18, SpO2  , Weight   Wt Readings from Last 2 Encounters:   18 86.6 kg (191 lb)   18 87 kg (191 lb 14.4 oz)       See doc flowsheet    Temp: 36.9  C (98.5  F) Temp  Min: 36.4  C (97.6  F)  Max: 37.1  C (98.8  F)  Resp: 18 Resp  Min: 18  Max: 19    No Data Recorded  Pulse: 77 Pulse  Min: 59  Max: 77  Heart Rate: 68 Heart Rate  Min: 68  Max: 70  BP: 118/66 Systolic (24hrs), Av , Min:113 , Max:127   Diastolic (24hrs), Av, Min:62, Max:86      NPO STATUS: see doc flowsheet    LABS:    BMP:  No results for input(s): NA, POTASSIUM, CHLORIDE, CO2, BUN, CR, GLC, DAVINA in the last 75742 hours.    LFTs:   No results for input(s): PROTTOTAL, ALBUMIN, BILITOTAL, ALKPHOS, AST, ALT, BILIDIRECT in the last 85104 hours.    CBC:   Recent Labs   Lab Test  18   1538   WBC  11.8*   RBC  3.94   HGB  12.2   HCT  37.8   MCV  96   MCH  31.0   MCHC  32.3   RDW  13.3   PLT  153       Coags:  No results for input(s): INR, PTT, FIBR in the last 36150 hours.    Imaging:  No orders to display       Alexander Boogie MD  Anesthesiology Staff  Pager [phone]    3/11/2018  4:23  AM        Anesthesia Plan      History & Physical Review  History and physical reviewed and following examination; no interval change.    ASA Status:  2 emergent.        Plan for Epidural          Postoperative Care  Postoperative pain management:  Neuraxial analgesia.      Consents  Anesthetic plan, risks, benefits and alternatives discussed with:  Patient..

## 2018-03-11 NOTE — DISCHARGE SUMMARY
[facility] Discharge Summary    Henrik Fletcher MRN# [id number]   Age: 28 year old YOB: 1990     Date of Admission:  3/9/2018  Date of Discharge:  3/13/2018  Admitting Physician:  Kristine Shah MD  Discharge Physician:  Dr. Hooper    Admit Dx:   - Intrauterine pregnancy at 40w3d   - Oligohydramnios  - GBS +    Discharge Dx:  - Same as above, s/p   - asymptomatic anemia of acute blood loss    Procedures:  - Spontaneous vaginal delivery  - Epidural analgesia    Admit HPI/Labor Course:  Henrik Fletcher is a 28 year old  who was admitted for IOL in the setting of oligohydramnios.  She received cytotec x2, expelled 2 cook catheters then received cervidil x12 hours. Following this she had SROM clear fluid and pitocin was started. With this she progressed into active labor and became complete. She began pushing with excellent descent and at 1406 delivered a baby boy in BINTA position. Placenta delivered spontaneously and appeared intact with three vessel cord. Patient examined and noted to have right sulcal and 2nd degree perineal tear, repaired after injection of local with running 3-0 vicryl suture. Prophylactic misoprostol given.  mL    Please see her Admission H&P and Delivery Summary for further details.    Postpartum Course:  Her postpartum course was complicated by postoperative urinary retention requiring straight cath x2 and rey replacement.  The rey was removed in the AM on PPD#2 and she underwent an active trial of void which she passed. On PPD#2, she was meeting all of her postpartum goals and deemed stable for discharge. She was voiding without difficulty, tolerating a regular diet without nausea and vomiting, her pain was well controlled on oral pain medicines and her lochia was appropriate. Her hemoglobin prior to delivery was 12.2 and after delivery was 10.2. Her Rh status was positive, and Rhogam was not  indicated.     Discharge Medications:     Review of your medicines      START taking       Dose / Directions    acetaminophen 325 MG tablet   Commonly known as:  TYLENOL        Dose:  650 mg   Take 2 tablets (650 mg) by mouth every 4 hours as needed for mild pain or fever (greater than or equal to 38  C /100.4  F (oral) or 38.5  C/ 101.4  F (core).)   Quantity:  100 tablet   Refills:  0       ibuprofen 600 MG tablet   Commonly known as:  ADVIL/MOTRIN        Dose:  600 mg   Take 1 tablet (600 mg) by mouth every 6 hours as needed for moderate pain   Quantity:  40 tablet   Refills:  0       senna-docusate 8.6-50 MG per tablet   Commonly known as:  SENOKOT-S;PERICOLACE        Dose:  1 tablet   Take 1 tablet by mouth 2 times daily as needed for constipation   Quantity:  100 tablet   Refills:  1         CONTINUE these medicines which have NOT CHANGED       Dose / Directions    HYDROCORTISONE PO        Refills:  0       ketoconazole 2 % cream   Commonly known as:  NIZORAL        Apply topically daily   Refills:  0       prenatal multivitamin plus iron 27-0.8 MG Tabs per tablet   Used for:  Supervision of normal first pregnancy        Dose:  1 tablet   Take 1 tablet by mouth daily   Quantity:  100 tablet   Refills:  3       VITAMIN D-3 PO        Dose:  2000 Int'l Units   Take 2,000 Int'l Units by mouth   Refills:  0            Where to get your medicines      These medications were sent to [facility] [address]  [address] [address]     Phone:  [phone]      acetaminophen 325 MG tablet     ibuprofen 600 MG tablet     senna-docusate 8.6-50 MG per tablet             Discharge/Disposition:  Henrik Fletcher was discharged to home in stable condition with the following instructions/medications:  1) Call for temperature > 100.4, bright red vaginal bleeding >1 pad an hour x 2 hours, foul smelling vaginal discharge, pain not controlled by usual oral  pain meds, persistent nausea and vomiting not controlled on medications  2) For feeding she decided to breastfeed  4) She was instructed to follow-up with her primary OB in 6 weeks for a routine postpartum visit.    Michelle Lester MD   OB/Gyn Resident, PGY-4  March 13, 2018

## 2018-03-11 NOTE — ANESTHESIA PROCEDURE NOTES
Epidural Procedure Note    Staff:     Anesthesiologist:  DUGLAS CHACON  Location: OB     Procedure start time:  3/11/2018 4:25 AM     Procedure end time:  3/11/2018 4:55 AM   Pre-procedure checklist:   patient identified, IV checked, site marked, risks and benefits discussed, informed consent, monitors and equipment checked, pre-op evaluation, at physician/surgeon's request and post-op pain management      Correct Patient: Yes      Correct Position: Yes      Correct Site: Yes      Correct Procedure: Yes      Correct Laterality:  Yes    Site Marked:  Yes  Procedure:     Procedure:  Epidural catheter    ASA:  2 and Emergent    Diagnosis:  Labor epidural catheter    Position:  Sitting    Sterile Prep: chloraprep, mask, sterile gloves and patient draped      Insertion site:  L3-4    Local skin infiltration:  1% lidocaine    amount (mL):  3    Approach:  Midline    Needle gauge (G):  17    Needle Length (in):  3.5    Block Needle Type:  Touhy    Injection Technique:  LORT saline    SANAZ at (cm):  5.5    Attempts:  1    Redirects:  0    Catheter gauge (G):  19    Catheter threaded easily: Yes      Threaded to cm at skin:  10    Threaded in epidural space (cm):  4.5    Paresthesias:  No    Aspiration negative for Heme or CSF: Yes      Test dose (mL):  3     Local anesthetic:  Lidocaine 1.5% w/ 1:200,000 epinephrine    Test dose time:  04:42    Test dose negative for signs of intravascular, subdural or intrathecal injection: Yes    Assessment/Narrative:      Improvement in contraction pain.

## 2018-03-11 NOTE — PROGRESS NOTES
Post Partum Progress Note  PPD#1    Subjective:  She is resting comfortably in bed this morning. Pain is improving and well controlled on current medication regimen. Tolerating PO intake.  Lochia present and minimal.  Has not voided spontaneously.  Ambulating without dizziness or difficulty.   Plans to breastfeed.    Objective:  Vitals:    18 1913 18 2042 18 0053 18 0503   BP: 117/68 125/75 105/61 118/80   Pulse:       Resp:  16   Temp: 99.1  F (37.3  C) 98.8  F (37.1  C) 98.3  F (36.8  C) 97.4  F (36.3  C)   TempSrc:  Oral Oral Oral   SpO2: 97% 98%       General: NAD  CV: RRR.  Pulm: CTAB. Normal respiratory effort.  Abd: Soft, non-tender, non-distended. Fundus is firm and below the umbilicus..    # Pain Assessment:   Current Pain Score 3/12/2018 3/12/2018 3/12/2018   Patient currently in pain? denies yes denies   Pain score (0-10) - - -   Pain location - Perineum -   Pain descriptors - Aching;Sore -   - Henrik is experiencing pain due to labor and delivery. Pain management was discussed and the plan was created in a collaborative fashion.  Henrik's response to the current recommendations: compliant  - Pain plan described below    Assessment/Plan:  Henrik Fletcher is a 28 year old  female who is PPD#1 s/p .  Course complicated by suspected endometritis.     - Endometritis: Currently on Gent/Clinda, plan to continue for 24 hours afebrile. Tlast/max 102.3 @1552 on 3/11.  - PNC: Rh pos. Rubella immune. No intervention indicated.  - Pain: controlled on oral medications  - Heme: Hgb 12.2 > > am pending.  If <10 will discharge home with iron.  - GI: continue anti-emetics and stool softeners as needed.  - : Urinary retention, straight cath this AM. Follow-up voiding  - Infant: Stable in room  - Feeding: Plans on breastfeeding.    Discharge to home on PPD#1-2    Vianca Cummings MD  OBN PGY3

## 2018-03-11 NOTE — PROGRESS NOTES
Intrapartum Progress Note     S: Patient is now starting to feel some pressure. No urge to push.      O:   /66  Pulse 77  Temp 99.8  F (37.7  C) (Oral)  Resp 18  LMP 2017 (Exact Date)  SpO2 98%   Gen: sitting up in bed, appears comfortable  SVE: 9 cm per RN       FHT:   Baseline 150  Variability moderate  Accels +  Decels early decels present     [place]: 2-3 contractions in 10 min     Membranes: SROM at 0340     A/P: Ms. Henrik Fletcher is a 28 year old  at 40w5d by LMP, here for induction of labor for oligohydramnios at term. Her pregnancy is complicated by GBS colonization.      - Labor                         - Admit to L&D for IOL d/t oligohydramnios                         - PNC: Rh positive. Rubella immune. GBS positive. PCN to start now                         - Fen/GI: Clear liquid diet, IVF                         - Plan: S/p cytotec x2, expelled 2 cook catheters without cervical change.  S/p 12 hours cervidil.  SROM at 0340 clear fluid.  Now nearly complete, will recheck in 1 hour, sooner if pt with urge to push              - Pain management: s/p morphine and vistaril      -Fetal Well Being                         - category 2, overall reassuring                         - Cephalic by BSUS. EFW 7.5Ib                         - Continue EFM and [place]    Toshia Callaway PGY3  3/11/2018 12:44 PM     Physician Attestation   I, Kristine Shah, saw this patient with the resident and agree with the resident s findings and plan of care as documented in the resident s note.      I personally reviewed vital signs, medications, labs and fetal heart rate tracing..    Key findings: Intermittent category 2 tracing. Progressing in labor.    Kristine Shah  Date of Service (when I saw the patient): 18

## 2018-03-11 NOTE — PROGRESS NOTES
Intrapartum Progress Note    S: Patient is having much more painful contractions.  She reports that her water broke.  She would like epidural now for pain control.      O:   Patient Vitals for the past 24 hrs:   BP Temp Temp src Pulse Heart Rate Resp   03/10/18 2245 118/66 98.5  F (36.9  C) Oral - 68 18   03/10/18 1950 118/74 98.2  F (36.8  C) Oral - 70 18   03/10/18 1545 127/67 98.5  F (36.9  C) Oral - - 19   03/10/18 1445 126/85 97.6  F (36.4  C) Oral 77 - -   03/10/18 1200 121/62 98  F (36.7  C) Oral 59 - -   03/10/18 1000 126/86 - - - - -   03/10/18 0730 120/79 98.8  F (37.1  C) Oral - - -   03/10/18 0504 113/75 98.2  F (36.8  C) - - - -   03/10/18 0401 119/68 - - - - -   ]  Gen: sitting up in bed, appears comfortable  SVE: 2.5/50/-2/medium/posterior 1419, deferred at this time per patient request.      She has now expelled 2 cook catheters without change.  One placed digitally by myself and one placed with speculum exam by Dr. Hooper    FHT:   Baseline 130  Variability moderate  Accels Present  Decels no    [place]: 2-3 contractions in 10 min    Membranes: SROM at 0340    A/P: Ms. Henrik Fletcher is a 28 year old  at 40w5d by LMP, here for induction of labor for oligohydramnios at term. Her pregnancy is complicated by GBS colonization.     - Labor                         - Admit to L&D for IOL d/t oligohydramnios                         - PNC: Rh positive. Rubella immune. GBS positive. PCN to start now                         - Fen/GI: Clear liquid diet, IVF                         - Last SVE: deferred now per patient request.  Will start Pitocin and check cervix when patient is comfortable with epidural in place.                         - Plan: S/p cytotec x2, expelled 2 cook catheters without cervical change.  S/p 12 hours cervidil.  SROM at 0340 clear fluid.  Will start pitocin when patient is comfortable with epidural in place                         - Pain management: s/p morphine and  vistaril     -Fetal Well Being                         - Overall category 1, reactive                         - Cephalic by BSUS. EFW 7.5Ib                         - Continue EFM and [place]    Amalia Pena MD   OBGYN PGY3

## 2018-03-11 NOTE — PLAN OF CARE
Problem: Patient Care Overview  Goal: Plan of Care/Patient Progress Review  Outcome: Therapy, progress toward functional goals as expected  Patient more comfortable with epidural and is not feeling any of her contractions. Spontaneously ruptured at 0340 with clear fluid. Cervidil removed at 0345. Penicillin started for GBS. Provider discussed starting pitocin with patient after she got her epidural. Will start pitocin per orders. Patient and spouse agreeable with plan. Will continue to closely monitor and notify provider of any changes.

## 2018-03-11 NOTE — PLAN OF CARE
Problem: Labor (Cervical Ripen, Induct, Augment) (Adult,Obstetrics,Pediatric)  Goal: Signs and Symptoms of Listed Potential Problems Will be Absent, Minimized or Managed (Labor)  Signs and symptoms of listed potential problems will be absent, minimized or managed by discharge/transition of care (reference Labor (Cervical Ripen, Induct, Augment) (Adult,Obstetrics,Pediatric) CPG).   Pt continues in labor. Pitocin is being titrated as needed, epidural infusing and pt using PCA button appropriately. Pt Temp max 100.2 before noon, MD informed. Continue to monitor closely, expect .

## 2018-03-11 NOTE — PLAN OF CARE
Problem: Labor (Cervical Ripen, Induct, Augment) (Adult,Obstetrics,Pediatric)  Goal: Signs and Symptoms of Listed Potential Problems Will be Absent, Minimized or Managed (Labor)  Signs and symptoms of listed potential problems will be absent, minimized or managed by discharge/transition of care (reference Labor (Cervical Ripen, Induct, Augment) (Adult,Obstetrics,Pediatric) CPG).   Pt found to be 10/100/+2 at 1322 and she started pushing.  baby boy at 1406, placenta at 1411.

## 2018-03-11 NOTE — PROVIDER NOTIFICATION
03/11/18 0644   Provider Notification   Provider Name/Title Dr. Pena   Method of Notification Electronic Page   Notification Reason Fetal Baseline Change   Provider notified of cervical check and that fetal baseline has increased from 135's to 150's since SROM at 0340. Pt's temperature is 99.3.

## 2018-03-11 NOTE — PROGRESS NOTES
Intrapartum Progress Note    S: Patient states that her pain is much improved after the dose of morphine and vistaril.  She is still having some mild cramping.      O:   Patient Vitals for the past 24 hrs:   BP Temp Temp src Pulse Heart Rate Resp   03/10/18 1950 118/74 98.2  F (36.8  C) Oral - 70 18   03/10/18 1545 127/67 98.5  F (36.9  C) Oral - - 19   03/10/18 1445 126/85 97.6  F (36.4  C) Oral 77 - -   03/10/18 1200 121/62 98  F (36.7  C) Oral 59 - -   03/10/18 1000 126/86 - - - - -   03/10/18 0730 120/79 98.8  F (37.1  C) Oral - - -   03/10/18 0504 113/75 98.2  F (36.8  C) - - - -   03/10/18 0401 119/68 - - - - -   03/10/18 0200 119/63 - - - - -   03/10/18 0055 119/79 - - - - -   18 2344 109/71 97.9  F (36.6  C) Oral - - 18   18 2242 119/75 - - - - -   18 2151 115/72 - - - - -   18 2049 116/73 - - - - -   ]  Gen: sitting up in bed, appears comfortable  SVE: 2.5/50/-2/medium/posterior 1419     Patient's cervix has not changed.  She has now expelled 2 cook catheters without change.  One placed digitally by myself and one placed with speculum exam by Dr. Hooper    FHT:   Baseline 140  Variability moderate  Accels Present  Decels two subtle late decelerations    [place]: 2-3 contractions in 10 min    Membranes: intact    A/P: Ms. Henrik Fletcher is a 28 year old  at 40w4d by LMP, here for induction of labor for oligohydramnios at term. Her pregnancy is complicated by GBS colonization.     - Labor                         - Admit to L&D for IOL d/t oligohydramnios                         - PNC: Rh positive. Rubella immune. GBS positive. PCN in active labor.                          - Fen/GI: Clear liquid diet, IVF                         - Last SVE: /-3, Membranes: intact                         - Plan: S/p cytotec x2, expelled 2 cook catheters without cervical change.  Cervidil placed at 1400, will remove and recheck cervix at 0200                         - Pain management:  s/p morphine and vistaril     -Fetal Well Being                         - Overall category 1, reactive prior to morphine dose.  Two subtle late decelerations, isolated.  Overall moderate variability and reassuring.                          - Cephalic by BSUS. EFW 7.5Ib                         - Continue EFM and [place]    Amalia Pena MD   OBGYN PGY3

## 2018-03-12 LAB — HGB BLD-MCNC: 10.2 G/DL (ref 11.7–15.7)

## 2018-03-12 PROCEDURE — 85018 HEMOGLOBIN: CPT | Performed by: OBSTETRICS & GYNECOLOGY

## 2018-03-12 PROCEDURE — 36415 COLL VENOUS BLD VENIPUNCTURE: CPT | Performed by: OBSTETRICS & GYNECOLOGY

## 2018-03-12 PROCEDURE — 25000132 ZZH RX MED GY IP 250 OP 250 PS 637: Performed by: OBSTETRICS & GYNECOLOGY

## 2018-03-12 PROCEDURE — 12000030 ZZH R&B OB INTERMEDIATE UMMC

## 2018-03-12 PROCEDURE — 25000125 ZZHC RX 250: Performed by: OBSTETRICS & GYNECOLOGY

## 2018-03-12 PROCEDURE — 25000128 H RX IP 250 OP 636: Performed by: OBSTETRICS & GYNECOLOGY

## 2018-03-12 RX ORDER — SODIUM CHLORIDE 9 MG/ML
INJECTION, SOLUTION INTRAVENOUS
Status: DISPENSED
Start: 2018-03-12 | End: 2018-03-12

## 2018-03-12 RX ADMIN — ACETAMINOPHEN 650 MG: 325 TABLET, FILM COATED ORAL at 13:51

## 2018-03-12 RX ADMIN — SENNOSIDES AND DOCUSATE SODIUM 1 TABLET: 8.6; 5 TABLET ORAL at 08:00

## 2018-03-12 RX ADMIN — IBUPROFEN 800 MG: 800 TABLET ORAL at 08:00

## 2018-03-12 RX ADMIN — ACETAMINOPHEN 650 MG: 325 TABLET, FILM COATED ORAL at 09:24

## 2018-03-12 RX ADMIN — GENTAMICIN SULFATE 140 MG: 40 INJECTION, SOLUTION INTRAMUSCULAR; INTRAVENOUS at 09:24

## 2018-03-12 RX ADMIN — CLINDAMYCIN PHOSPHATE 900 MG: 18 INJECTION, SOLUTION INTRAVENOUS at 00:41

## 2018-03-12 RX ADMIN — IBUPROFEN 800 MG: 800 TABLET ORAL at 20:26

## 2018-03-12 RX ADMIN — IBUPROFEN 800 MG: 800 TABLET ORAL at 14:30

## 2018-03-12 RX ADMIN — ACETAMINOPHEN 650 MG: 325 TABLET, FILM COATED ORAL at 05:04

## 2018-03-12 RX ADMIN — CLINDAMYCIN PHOSPHATE 900 MG: 18 INJECTION, SOLUTION INTRAVENOUS at 08:01

## 2018-03-12 RX ADMIN — GENTAMICIN SULFATE 140 MG: 40 INJECTION, SOLUTION INTRAMUSCULAR; INTRAVENOUS at 01:51

## 2018-03-12 RX ADMIN — SENNOSIDES AND DOCUSATE SODIUM 2 TABLET: 8.6; 5 TABLET ORAL at 20:26

## 2018-03-12 RX ADMIN — GENTAMICIN SULFATE 140 MG: 40 INJECTION, SOLUTION INTRAMUSCULAR; INTRAVENOUS at 17:16

## 2018-03-12 RX ADMIN — ACETAMINOPHEN 650 MG: 325 TABLET, FILM COATED ORAL at 01:05

## 2018-03-12 RX ADMIN — ACETAMINOPHEN 650 MG: 325 TABLET, FILM COATED ORAL at 18:03

## 2018-03-12 RX ADMIN — ACETAMINOPHEN 650 MG: 325 TABLET, FILM COATED ORAL at 22:58

## 2018-03-12 RX ADMIN — IBUPROFEN 800 MG: 800 TABLET ORAL at 01:05

## 2018-03-12 RX ADMIN — CLINDAMYCIN PHOSPHATE 900 MG: 18 INJECTION, SOLUTION INTRAVENOUS at 16:10

## 2018-03-12 NOTE — PLAN OF CARE
Problem: Patient Care Overview  Goal: Plan of Care/Patient Progress Review  Outcome: Improving  Data: Henrik Fletcher transferred to 7121 via wheelchair at 2005. Baby transferred via parent's arms.  Action: Receiving unit notified of transfer: Yes. Patient and family notified of room change. Report given to ELVIE Perales at 1940. Belongings sent to receiving unit. Accompanied by Registered Nurse. Oriented patient to surroundings. Call light within reach. ID bands double-checked with receiving RN.  Response: Patient tolerated transfer and is stable.

## 2018-03-12 NOTE — PLAN OF CARE
Problem: Patient Care Overview  Goal: Plan of Care/Patient Progress Review  Outcome: No Change  VSS. Postpartum checks WDL. Pt is breastfeeding with assist from staff. Baby hesitant to latch and maintain suck. Hand expression reviewed; mom gets a couple drops colostrum while hand expressing.     Clindamicin and gentimicin given d/t endometritis. Afebrile. Pain controlled with tylenol and ibuprofen. Ambulating independently. Unable to void for entirety of shift. Straight cath'd twice this shift. Resident notified on AM rounds. Continue to monitor.

## 2018-03-12 NOTE — PLAN OF CARE
Patient arrived to [facility] unit via wheelchair at 2010,with belongings, accompanied by spouse/ significant other, with infant in arms. Received report from ELVIE Donovan  and checked bands. Unit and room orientation completd. Call light given; no concerns present at this time. Continue with plan of care.

## 2018-03-12 NOTE — PLAN OF CARE
Problem: Patient Care Overview  Goal: Plan of Care/Patient Progress Review  Outcome: Improving  Pt.'s vitals are stable. Pain is managed with tylenol and ibuprofen. Perineal area is very sensitive and edematous. Lopez catheter was placed at 1250 due to urinary retention and resident's order and catheter care done at 1700. Adequate urine output in catheter. Pt. is receiving Gentamycin and Clindamycin IV. Pt. Is breastfeeding well with encouragement and some assist. Hand expression and spoon feeding was taught. Pt. was also seen by LC. Pt. And spouse are bonding well with the baby and are responsive to infant cues.

## 2018-03-12 NOTE — PLAN OF CARE
"Problem: Postpartum (Vaginal Delivery) (Adult,Obstetrics,Pediatric)  Goal: Signs and Symptoms of Listed Potential Problems Will be Absent, Minimized or Managed (Postpartum)  Signs and symptoms of listed potential problems will be absent, minimized or managed by discharge/transition of care (reference Postpartum (Vaginal Delivery) (Adult,Obstetrics,Pediatric) CPG).   Outcome: Improving  Pt straight cath'd x2 this shift. Unable to void d/t bladder still \"waking up\" from epidural.       "

## 2018-03-12 NOTE — PROVIDER NOTIFICATION
03/11/18 1600   Provider Notification   Provider Name/Title Dr. Callaway   Method of Notification Phone   Request Evaluate in Person   Notification Reason Vital Signs Change     Pt noted to have a temp of 102.3. Provider notified.  Plan per provider is to treat for endometritis.

## 2018-03-12 NOTE — LACTATION NOTE
This note was copied from a baby's chart.  Consult for difficulty with latch, mom with dense breast tissue and infant thrusting tongue. Both currently on IV antibiotics for chorio prophylaxis. Vaginal delivery @ 40w5d, 908 QBL, AGA. Good diaper output, good amount hand expressed in L&D. Breast exam of mother: soft but somewhat dense breast tissue, symmetrical, reports symmetrical growth in pregnancy. Nipples intact, everted bilaterally; left one denser than right, nipple retracts on left with compression. Oral exam of infant: mild recess chin and higher arched palate but within normal limits; tongue extends easily but when suck on finger, holds it back behind gumline and some thrusting. Allow sucking on finger for several minutes, encouraging tongue forward with finger before transfer to breast, eventually able to suck several times without biting/thrusting.     Education provided about anatomy of breast and infant mouth, importance of good latch, ways to get and maintain deep latch and how to tell if getting enough.  Pointed out nutritive vs. non nutritive suck, how to listen for swallows and reviewed feeding log, when and who to call for concerns. Demo latching techniques and had mom return demo, taught her to break seal, latch and use breast massage/compressions during feeding. Infant fed well with sustained, rhythmic suck (some nutritive) and occasional swallow on right side for about 20 minutes, did not maintain latch on left. Had mom return demo of hand expression, she was able to get one drop out when LC there. Encourage hand expressing after visit (after each feeding) and only would need to pump if infant not feeding well or want to try bringing nipple out more on left side prior to lach.

## 2018-03-13 VITALS
HEART RATE: 80 BPM | RESPIRATION RATE: 18 BRPM | OXYGEN SATURATION: 98 % | DIASTOLIC BLOOD PRESSURE: 72 MMHG | SYSTOLIC BLOOD PRESSURE: 122 MMHG | TEMPERATURE: 97.7 F

## 2018-03-13 PROCEDURE — 25000132 ZZH RX MED GY IP 250 OP 250 PS 637: Performed by: OBSTETRICS & GYNECOLOGY

## 2018-03-13 RX ADMIN — SENNOSIDES AND DOCUSATE SODIUM 2 TABLET: 8.6; 5 TABLET ORAL at 08:47

## 2018-03-13 RX ADMIN — IBUPROFEN 800 MG: 800 TABLET ORAL at 08:47

## 2018-03-13 RX ADMIN — ACETAMINOPHEN 650 MG: 325 TABLET, FILM COATED ORAL at 02:44

## 2018-03-13 RX ADMIN — IBUPROFEN 800 MG: 800 TABLET ORAL at 02:44

## 2018-03-13 RX ADMIN — ACETAMINOPHEN 650 MG: 325 TABLET, FILM COATED ORAL at 08:47

## 2018-03-13 NOTE — PLAN OF CARE
Problem: Patient Care Overview  Goal: Plan of Care/Patient Progress Review  Outcome: Adequate for Discharge Date Met: 03/13/18  Data: Vital signs stable, and FF at U/1 with small lochia.  Pt is up voiding and did soak in the tub this morning. Pt is breastfeeding well with minimal assist latching/ positioning. Nipples are in tact and no soreness per pt. Pt pumped once and had nothing. Complains of rosario discomfort.   Action: pt medicated with Ibuprofen and Tylenol for pain control. Encouraged pt to keeping soaking twice a day in the tub. Checked latch and assisted with deeper latching. Review of care plan, teaching, and discharge instructions done with pt and the  doing some interpretation. Infant identification with ID bands done, pt verification with signature obtained. Pt was given discharge medications with instructions and copies of the discharge papers.  Response: pt states understanding and comfort with infant cares and feeding. All questions about baby care addressed. Pt discharged with baby today.

## 2018-03-13 NOTE — PROGRESS NOTES
S: feeling good, no c/o.  Minimal lochia.  Breastfeeding.  Lopez was taken out around 0615 and had voided twice, first was not measured, then 225ml.    O: /77  Pulse 83  Temp 97.7  F (36.5  C) (Oral)  Resp 16  LMP 2017 (Exact Date)  SpO2 98%  Breastfeeding? Unknown     Abd: SNT, fundus firm  Ex: no calf tenderness  Hgb: 10.2    A/P:  1) PPD #2 S/P , urinary retention-resolved   Doing well, ok for discharge.  Instructions given.  Rx for ibuprofen and colace given.  RTC 6 weeks.     TOPHER CRUZ MD

## 2018-03-13 NOTE — PLAN OF CARE
Problem: Patient Care Overview  Goal: Plan of Care/Patient Progress Review  Data: Vital signs within normal limits. Postpartum checks within normal limits - see flow record. Patient eating and drinking normally. Bladder irrigation performed at 0615 and rey removed. Patient requested to have some time alone to try and void as she doesn't want to feel pressured to void. Attempted to check on patients progress at 0655, patient in shower. Awaiting spontaneous void. Patient performing self cares and is able to care for infant.  Action: Pain has been adequately managed with Tylenol and Ibuprofen. Patient education done about breast massage, hand expression, and alternative feeding positions. See flow record.  Response: Positive attachment behaviors observed with infant. Support person, spouse: [place], present.   Plan: Continue with the plan of care.

## 2018-04-09 LAB — COPATH REPORT: NORMAL

## 2018-04-30 ENCOUNTER — PRENATAL OFFICE VISIT (OUTPATIENT)
Dept: OBGYN | Facility: CLINIC | Age: 28
End: 2018-04-30
Payer: COMMERCIAL

## 2018-04-30 VITALS
SYSTOLIC BLOOD PRESSURE: 115 MMHG | DIASTOLIC BLOOD PRESSURE: 71 MMHG | OXYGEN SATURATION: 99 % | TEMPERATURE: 98.6 F | BODY MASS INDEX: 28.21 KG/M2 | HEIGHT: 66 IN | HEART RATE: 70 BPM | WEIGHT: 175.5 LBS

## 2018-04-30 DIAGNOSIS — R25.2 CRAMP OF LIMB: ICD-10-CM

## 2018-04-30 PROCEDURE — 99207 ZZC PRENATAL VISIT: CPT | Performed by: OBSTETRICS & GYNECOLOGY

## 2018-04-30 PROCEDURE — G0145 SCR C/V CYTO,THINLAYER,RESCR: HCPCS | Performed by: OBSTETRICS & GYNECOLOGY

## 2018-04-30 PROCEDURE — 87624 HPV HI-RISK TYP POOLED RSLT: CPT | Performed by: OBSTETRICS & GYNECOLOGY

## 2018-04-30 NOTE — PROGRESS NOTES
"Chief Complaint   Patient presents with     Post Partum Exam       Initial /71 (BP Location: Left arm, Patient Position: Sitting, Cuff Size: Adult Regular)  Pulse 70  Temp 98.6  F (37  C) (Oral)  Ht 5' 5.98\" (1.676 m)  Wt 175 lb 8 oz (79.6 kg)  SpO2 99%  Breastfeeding? Yes  BMI 28.34 kg/m2 Estimated body mass index is 28.34 kg/(m^2) as calculated from the following:    Height as of this encounter: 5' 5.98\" (1.676 m).    Weight as of this encounter: 175 lb 8 oz (79.6 kg).  BP completed using cuff size: regular        The following HM Due: NONE      The following patient reported/Care Every where data was sent to:  P ABSTRACT QUALITY INITIATIVES [07038]  n/a      n/a and patient has appointment for today            SUBJECTIVE:  Henrik Fletcher is a 28 year old female   here for a postpartum visit.  She had a  on 3/11/18 delivering a healthy baby boy weighing 6 lbs 9 oz at term.    Overall doing very well but gets bad leg and arm cramps sometimes.     delivery complications:  No  breast feeding:  Yes  bladder problems:  No  bowel problems/hemorrhoids:  No  episiotomy/laceration/incision healed? Yes  vaginal flow:  Stopped for 20 days and now on and off for 2 wks.   contraception:  Natural methods  emotional adjustment:  doing well, happy and tired     PHQ-9 SCORE 2017   Total Score 0 0 0           OBJECTIVE:  Blood pressure 115/71, pulse 70, temperature 98.6  F (37  C), temperature source Oral, height 5' 5.98\" (1.676 m), weight 175 lb 8 oz (79.6 kg), SpO2 99 %, currently breastfeeding.   General - pleasant female in no acute distress.  Breast - no nodularity, asymmetry or nipple discharge bilaterally.  Abdomen - soft, nontender, nondistended, no hepatosplenomegaly.  Pelvic - EG: normal adult female, BUS: within normal limits, Vagina: well rugated, no discharge, Cervix: no lesions or CMT, Uterus: firm, normal sized and nontender, Adnexae: no masses or " tenderness.  Rectovaginal - deferred.    ASSESSMENT:  normal postpartum exam     PLAN:   May resume normal activities without restrictions  Pap smear was  done today  Recommended adequate oral hydration and can try magnesium for cramps.        The patient will use natural methods for birth control. Return to clinic in one year for an annual, when due for a pap smear or PRN.    Casi Dowling MD

## 2018-04-30 NOTE — LETTER
May 16, 2018    Henrik Kellen Mclaughlinsenait  [address]  [facility] [address]    Dear Henrik,  We are happy to inform you that your PAP smear result from 04/30/18 is normal.  We are now able to do a follow up test on PAP smears. The DNA test is for HPV (Human Papilloma Virus). Cervical cancer is closely linked with certain types of HPV. Your results showed no evidence of high risk HPV.  Therefore we recommend you return in 3 years for your next pap smear and HPV test.  You will still need to return to the clinic every year for an annual exam and other preventive tests.  Please contact the clinic at [phone] with any questions.  Sincerely,    Casi Dowling MD/[facility]

## 2018-04-30 NOTE — MR AVS SNAPSHOT
"              After Visit Summary   4/30/2018    Henrik Fletcher    MRN: [id number]           Patient Information     Date Of Birth          1990        Visit Information        Provider Department      4/30/2018 2:15 PM Casi Dowling MD; [facility]        Today's Diagnoses     Routine postpartum follow-up    -  1    Cramp of limb           Follow-ups after your visit        Who to contact     If you have questions or need follow up information about today's clinic visit or your schedule please contact [facility] directly at [phone].  Normal or non-critical lab and imaging results will be communicated to you by MyChart, letter or phone within 4 business days after the clinic has received the results. If you do not hear from us within 7 days, please contact the clinic through [employer] or phone. If you have a critical or abnormal lab result, we will notify you by phone as soon as possible.  Submit refill requests through [employer] or call your pharmacy and they will forward the refill request to us. Please allow 3 business days for your refill to be completed.          Additional Information About Your Visit        MyChart Information     [employer] gives you secure access to your electronic health record. If you see a primary care provider, you can also send messages to your care team and make appointments. If you have questions, please call your primary care clinic.  If you do not have a primary care provider, please call [phone] and they will assist you.        Care EveryWhere ID     This is your Care EveryWhere ID. This could be used by other organizations to access your [place] medical records  [id number]        Your Vitals Were     Pulse Temperature Height Pulse Oximetry Breastfeeding? BMI (Body Mass Index)    70 98.6  F (37  C) (Oral) 5' 5.98\" (1.676 m) 99% Yes 28.34 kg/m2       Blood Pressure from Last 3 Encounters: "   04/30/18 115/71   03/13/18 122/72   03/09/18 121/79    Weight from Last 3 Encounters:   04/30/18 175 lb 8 oz (79.6 kg)   03/09/18 191 lb (86.6 kg)   03/05/18 191 lb 14.4 oz (87 kg)              We Performed the Following     Pap imaged thin layer diagnostic with HPV (select HPV order below)          Today's Medication Changes          These changes are accurate as of 4/30/18 11:59 PM.  If you have any questions, ask your nurse or doctor.               Start taking these medicines.        Dose/Directions    magnesium oxide 400 (241.3 Mg) MG tablet   Commonly known as:  MAG-OX   Used for:  Cramp of limb   Started by:  Casi Dowling MD        Dose:  400 mg   Take 1 tablet (400 mg) by mouth daily   Quantity:  60 tablet   Refills:  3         Stop taking these medicines if you haven't already. Please contact your care team if you have questions.     acetaminophen 325 MG tablet   Commonly known as:  TYLENOL   Stopped by:  Casi Dowling MD           HYDROCORTISONE PO   Stopped by:  Casi Dowling MD           ibuprofen 600 MG tablet   Commonly known as:  ADVIL/MOTRIN   Stopped by:  Casi Dowling MD           ketoconazole 2 % cream   Commonly known as:  NIZORAL   Stopped by:  Casi Dowling MD           senna-docusate 8.6-50 MG per tablet   Commonly known as:  SENOKOT-S;PERICOLACE   Stopped by:  Casi Dowling MD                Where to get your medicines      These medications were sent to [facility] - [place], [place] - [address] [facility]  [address], [place] [address]     Phone:  [phone]     magnesium oxide 400 (241.3 Mg) MG tablet                Primary Care Provider Fax #    Physician No Ref-Primary [phone]       No address on file        Equal Access to Services     CRISTIANE TAYLOR : Rg Bee, wamónica luqadaha, qaybta kaalmada nany, angela zepeda. So [facility] [phone].    ATENCIÓN: Si  george mustafa, tiene a ludwig disposición servicios gratuitos de asistencia lingüística. Jaret nuñez [phone].    We comply with applicable federal civil rights laws and [place] laws. We do not discriminate on the basis of race, color, national origin, age, disability, sex, sexual orientation, or gender identity.            Thank you!     Thank you for choosing [facility]  for your care. Our goal is always to provide you with excellent care. Hearing back from our patients is one way we can continue to improve our services. Please take a few minutes to complete the written survey that you may receive in the mail after your visit with us. Thank you!             Your Updated Medication List - Protect others around you: Learn how to safely use, store and throw away your medicines at www.disposemymeds.org.          This list is accurate as of 4/30/18 11:59 PM.  Always use your most recent med list.                   Brand Name Dispense Instructions for use Diagnosis    magnesium oxide 400 (241.3 Mg) MG tablet    MAG-OX    60 tablet    Take 1 tablet (400 mg) by mouth daily    Cramp of limb       prenatal multivitamin plus iron 27-0.8 MG Tabs per tablet     100 tablet    Take 1 tablet by mouth daily    Supervision of normal first pregnancy       VITAMIN D-3 PO      Take 2,000 Int'l Units by mouth

## 2018-05-01 ASSESSMENT — PATIENT HEALTH QUESTIONNAIRE - PHQ9: SUM OF ALL RESPONSES TO PHQ QUESTIONS 1-9: 0

## 2018-05-03 LAB
COPATH REPORT: NORMAL
PAP: NORMAL

## 2018-05-04 LAB
FINAL DIAGNOSIS: NORMAL
HPV HR 12 DNA CVX QL NAA+PROBE: NEGATIVE
HPV16 DNA SPEC QL NAA+PROBE: NEGATIVE
HPV18 DNA SPEC QL NAA+PROBE: NEGATIVE
SPECIMEN DESCRIPTION: NORMAL
SPECIMEN SOURCE CVX/VAG CYTO: NORMAL

## 2018-05-07 PROBLEM — R87.610 ASCUS OF CERVIX WITH NEGATIVE HIGH RISK HPV: Status: ACTIVE | Noted: 2018-05-07

## 2019-01-25 ENCOUNTER — ANCILLARY PROCEDURE (OUTPATIENT)
Dept: GENERAL RADIOLOGY | Facility: CLINIC | Age: 29
End: 2019-01-25

## 2019-01-25 ENCOUNTER — OFFICE VISIT (OUTPATIENT)
Dept: ORTHOPEDICS | Facility: CLINIC | Age: 29
End: 2019-01-25

## 2019-01-25 VITALS — WEIGHT: 173 LBS | HEART RATE: 72 BPM | HEIGHT: 66 IN | BODY MASS INDEX: 27.8 KG/M2

## 2019-01-25 DIAGNOSIS — S83.004A DISLOCATION OF RIGHT PATELLA, INITIAL ENCOUNTER: Primary | ICD-10-CM

## 2019-01-25 DIAGNOSIS — M25.561 ACUTE PAIN OF RIGHT KNEE: ICD-10-CM

## 2019-01-25 ASSESSMENT — MIFFLIN-ST. JEOR: SCORE: 1521.15

## 2019-01-25 NOTE — PROGRESS NOTES
"[facility]  Orthopedics  Maxim Zamora, DO  2019     Name: Henrik Fletcher  MRN: [id number]  Age: 29 year old  : 1990  Referring provider: Referred Self     Chief Complaint: Right knee pain     Date of Injury: 19    History of Present Illness:   Henrik Fletcher is a 29 year old,  female who presents today for evaluation of her right knee injury sustained on 19 after falling over a child barrier. Her knee twisted, and she felt a \"pop\". She also reported seeing her patella at her lateral knee, and she \"popped\" it back in with pressure.  Her pain and swelling began occurring slowly since then. Her pain is aching and sharp in nature, and it is exacerbated with standing, walking, and movement. She reports one incident last night when she was sleeping when she had to move her leg with her hands in order to move in the bed.     Review of Systems:   A 10-point review of systems was obtained and is negative except for as noted in the HPI.     Medications:   Current Outpatient Medications:      Cholecalciferol (VITAMIN D-3 PO), Take 2,000 Int'l Units by mouth, Disp: , Rfl:      magnesium oxide (MAG-OX) 400 (241.3 Mg) MG tablet, Take 1 tablet (400 mg) by mouth daily, Disp: 60 tablet, Rfl: 3     Prenatal Vit-Fe Fumarate-FA (PRENATAL MULTIVITAMIN  PLUS IRON) 27-0.8 MG TABS per tablet, Take 1 tablet by mouth daily (Patient not taking: Reported on 2019), Disp: 100 tablet, Rfl: 3    Allergies:  The patient reports no known allergies.    Past Medical History:  The patient denies any significant past medical history.    Past Surgical History:  The patient does not have any pertinent past surgical history.    Social Hx:  Lives with , 10 month old child  Breastfeeding currently    Family History:  No past pertinent family history.    Physical Examination:  Pulse 72, height 1.676 m (5' 5.98\"), weight 78.5 kg (173 lb), currently breastfeeding.  General  - normal appearance, " in no obvious distress  HEENT  -Pupils equal, round, no conjunctival injection.  No lid lag  CV  - normal popliteal pulse  Pulm  - normal respiratory pattern, non-labored  Musculoskeletal - right knee  - stance: normal gait without limp, normal single leg squat, no obvious leg length discrepancy  - inspection: Mild swelling of medial aspect of knee, normal bone and joint alignment, no obvious deformity, no significant effusion.  - palpation: Tenderness at the medial knee along the MPFL, medial aspect of the medial femoral condyle near MCL no joint line tenderness  - ROM: 140 - 0 painful flexion   - strength: 5/5 in flexion, 5/5 in extension  - special tests:  (-) Lachman  (-) anterior drawer  (-) posterior drawer  (-) Brigid  (-) varus at 0 and 30 degrees flexion  (+) valgus at 0 and 30 degrees flexion painful, no laxity.  (-) Jayjay s compression test  (-) patellar apprehension  Neuro  - no sensory or motor deficit, grossly normal coordination, normal muscle tone  Skin  - no ecchymosis, erythema, warmth, or induration, no obvious rash  Psych  - interactive, appropriate, normal mood and affect    Imaging:   XR right knee - 3 views (1/25/19)  Joint spaces are well preserved. Normal patella-femoral alignment. No acute osseus abnormality     I have independently reviewed the above imaging studies; the results were discussed with the patient.     Assessment:   29 year old, female with acute right knee pain and suspected patellar dislocation with self reduction which occurred yesterday.  Suspect injury to MPFL and MCL.  Given initial episode, discussed risk for subsequent episodes of subluxation and minimizing risk with proper immobilization and rehab.  They understand and wish to proceed with conservative care.    Plan:   1. Begin using knee immobilizer brace in order to keep knee in full extension. Weight bearing as tolerated   2. Begin taking extra strength Tylenol because other medications will interfere with  breastfeeding  3. Follow-up in 2 weeks for a brace that will give her more ROM, and plan to begin rehab at this time  4. If pain persists in 2 weeks/difficulty with progress, consider MRI.     Maxim Zamora DO [facility]  Primary Care Sports Medicine      I, Maxim Zamora DO, have reviewed the above note and agree with the scribe's notation as written.    Scribe Disclosure:   JON, Malcom Julien, am serving as a scribe to document services personally performed by Maxim Zamora DO at this visit, based upon the provider's statements to me. All documentation has been reviewed by the aforementioned provider prior to being entered into the official medical record.

## 2019-01-25 NOTE — LETTER
2019       RE: Henrik Fletcher  [address]  [facility] [address]     Dear Colleague,    Thank you for referring your patient, Henrik Fletcher, to the [facility] SPORTS AND ORTHOPAEDIC WALK IN CLINIC at [facility]. Please see a copy of my visit note below.          SPORTS & ORTHOPEDIC WALK-IN VISIT 2019    Primary Care Physician:      Right knee pain. Yesterday, tried to walk over a barrier placed for her crawling child and fell. Most of her pain is in the medial knee. She thinks she saw her patella on her lateral knee. She has quite a bit of pain while changing positions. She is okay at rest.     Reason for visit:     What part of your body is injured / painful?  right knee    What caused the injury /pain? Fall    How long ago did your injury occur or pain begin? yesterday    What are your most bothersome symptoms? Pain and Swelling    How would you characterize your symptom?  aching and sharp    What makes your symptoms better? Rest    What makes your symptoms worse? Standing, Walking and Movement    Have you been previously seen for this problem? No    Medical History:    Any recent changes to your medical history? No    Any new medication prescribed since last visit? No    Have you had surgery on this body part before? No    Review of Systems:    Do you have fever, chills, weight loss? No    Do you have any vision problems? No    Do you have any chest pain or edema? No    Do you have any shortness of breath or wheezing?  No    Do you have stomach problems? No    Do you have any numbness or focal weakness? No    Do you have diabetes? No    Do you have problems with bleeding or clotting? No    Do you have an rashes or other skin lesions? {YES/NO:669809}           [facility]  Orthopedics  Maxim Zamora,   2019     Name: Henrik Fletcher  MRN: [id number]  Age: 29 year old  : 1990  Referring provider:  "Referred Self     Chief Complaint: Right knee pain     Date of Injury: 1/24/19    History of Present Illness:   Henrik Fletcher is a 29 year old,  female who presents today for evaluation of her right knee injury sustained on 1/24/19 after falling over a child barrier. Her knee twisted, and she felt a \"pop\". She also reported seeing her patella at her lateral knee, and she \"popped\" it back in with pressure.  Her pain and swelling began occurring slowly since then. Her pain is aching and sharp in nature, and it is exacerbated with standing, walking, and movement. She reports one incident last night when she was sleeping when she had to move her leg with her hands in order to move in the bed.     Review of Systems:   A 10-point review of systems was obtained and is negative except for as noted in the HPI.     Medications:   Current Outpatient Medications:      Cholecalciferol (VITAMIN D-3 PO), Take 2,000 Int'l Units by mouth, Disp: , Rfl:      magnesium oxide (MAG-OX) 400 (241.3 Mg) MG tablet, Take 1 tablet (400 mg) by mouth daily, Disp: 60 tablet, Rfl: 3     Prenatal Vit-Fe Fumarate-FA (PRENATAL MULTIVITAMIN  PLUS IRON) 27-0.8 MG TABS per tablet, Take 1 tablet by mouth daily (Patient not taking: Reported on 1/25/2019), Disp: 100 tablet, Rfl: 3    Allergies:  The patient reports no known allergies.    Past Medical History:  The patient denies any significant past medical history.    Past Surgical History:  The patient does not have any pertinent past surgical history.    Social Hx:  Lives with , 10 month old child  Breastfeeding currently    Family History:  No past pertinent family history.    Physical Examination:  Pulse 72, height 1.676 m (5' 5.98\"), weight 78.5 kg (173 lb), currently breastfeeding.  General  - normal appearance, in no obvious distress  HEENT  -Pupils equal, round, no conjunctival injection.  No lid lag  CV  - normal popliteal pulse  Pulm  - normal respiratory pattern, " non-labored  Musculoskeletal - right knee  - stance: normal gait without limp, normal single leg squat, no obvious leg length discrepancy  - inspection: Mild swelling of medial aspect of knee, normal bone and joint alignment, no obvious deformity, no significant effusion.  - palpation: Tenderness at the medial knee along the MPFL, medial aspect of the medial femoral condyle near MCL no joint line tenderness  - ROM: 140 - 0 painful flexion   - strength: 5/5 in flexion, 5/5 in extension  - special tests:  (-) Lachman  (-) anterior drawer  (-) posterior drawer  (-) Brigid  (-) varus at 0 and 30 degrees flexion  (+) valgus at 0 and 30 degrees flexion painful, no laxity.  (-) Jayjay s compression test  (-) patellar apprehension  Neuro  - no sensory or motor deficit, grossly normal coordination, normal muscle tone  Skin  - no ecchymosis, erythema, warmth, or induration, no obvious rash  Psych  - interactive, appropriate, normal mood and affect    Imaging:   XR right knee - 3 views (1/25/19)  Joint spaces are well preserved. Normal patella-femoral alignment. No acute osseus abnormality     I have independently reviewed the above imaging studies; the results were discussed with the patient.     Assessment:   29 year old, female with acute right knee pain and suspected patellar dislocation with self reduction which occurred yesterday.  Suspect injury to MPFL and MCL.  Given initial episode, discussed risk for subsequent episodes of subluxation and minimizing risk with proper immobilization and rehab.  They understand and wish to proceed with conservative care.    Plan:   1. Begin using knee immobilizer brace in order to keep knee in full extension. Weight bearing as tolerated   2. Begin taking extra strength Tylenol because other medications will interfere with breastfeeding  3. Follow-up in 2 weeks for a brace that will give her more ROM, and plan to begin rehab at this time  4. If pain persists in 2 weeks/difficulty with  progress, consider MRI.     Maxim Zamora DO [facility]  Primary Care Sports Medicine      I, Maxim Zamora DO, have reviewed the above note and agree with the scribe's notation as written.    Scribe Disclosure:   I, Malcom Julien, am serving as a scribe to document services personally performed by Maxim Zamora DO at this visit, based upon the provider's statements to me. All documentation has been reviewed by the aforementioned provider prior to being entered into the official medical record.      Again, thank you for allowing me to participate in the care of your patient.      Sincerely,    Maxim Zamora DO

## 2019-01-25 NOTE — PROGRESS NOTES
SPORTS & ORTHOPEDIC WALK-IN VISIT 1/25/2019    Primary Care Physician:      Right knee pain. Yesterday, tried to walk over a barrier placed for her crawling child and fell. Most of her pain is in the medial knee. She thinks she saw her patella on her lateral knee. She has quite a bit of pain while changing positions. She is okay at rest.     Reason for visit:     What part of your body is injured / painful?  right knee    What caused the injury /pain? Fall    How long ago did your injury occur or pain begin? yesterday    What are your most bothersome symptoms? Pain and Swelling    How would you characterize your symptom?  aching and sharp    What makes your symptoms better? Rest    What makes your symptoms worse? Standing, Walking and Movement    Have you been previously seen for this problem? No    Medical History:    Any recent changes to your medical history? No    Any new medication prescribed since last visit? No    Have you had surgery on this body part before? No    Review of Systems:    Do you have fever, chills, weight loss? No    Do you have any vision problems? No    Do you have any chest pain or edema? No    Do you have any shortness of breath or wheezing?  No    Do you have stomach problems? No    Do you have any numbness or focal weakness? No    Do you have diabetes? No    Do you have problems with bleeding or clotting? No    Do you have an rashes or other skin lesions? No

## 2020-03-11 ENCOUNTER — HEALTH MAINTENANCE LETTER (OUTPATIENT)
Age: 30
End: 2020-03-11

## 2020-04-23 NOTE — PLAN OF CARE
Left message for patient to contact office. Problem: Patient Care Overview  Goal: Plan of Care/Patient Progress Review  Outcome: Therapy, progress toward functional goals is gradual  Labor Shift Note  Data: Contraction irregular, palpate cramping. Fetal assessment category one.   Vitals:    03/10/18 1000 03/10/18 1200 03/10/18 1445 03/10/18 1545   BP: 126/86 121/62 126/85 127/67   Pulse:  59 77    Resp:    19   Temp:  98  F (36.7  C) 97.6  F (36.4  C) 98.5  F (36.9  C)   TempSrc:  Oral Oral Oral   .  .  Leaking no amounts of fluid.  Signs and symptoms of infection absent.  Blood pressures stable. Signs and symptoms of pre-eclampsia: absent, .  Support person Ali present.  Interventions: Continue uterine/fetal assessment continuously. Vital Signs per order set. Comfort measures repositioning, heat and IV pain medication given.  Medicated for fentanyl IV.  Plan: Anticipate . Provide labor/coping assistance as needed by patient and support person.  Observe for and notify care provider of indications of progressing labor, need for pain medications,  or signs of fetal/maternal compromise. Pt sleeping after one dose of Fentanyl.

## 2020-12-27 ENCOUNTER — HEALTH MAINTENANCE LETTER (OUTPATIENT)
Age: 30
End: 2020-12-27

## 2021-04-24 ENCOUNTER — HEALTH MAINTENANCE LETTER (OUTPATIENT)
Age: 31
End: 2021-04-24

## 2021-10-09 ENCOUNTER — HEALTH MAINTENANCE LETTER (OUTPATIENT)
Age: 31
End: 2021-10-09

## 2022-05-21 ENCOUNTER — HEALTH MAINTENANCE LETTER (OUTPATIENT)
Age: 32
End: 2022-05-21

## 2022-09-17 ENCOUNTER — HEALTH MAINTENANCE LETTER (OUTPATIENT)
Age: 32
End: 2022-09-17

## 2023-06-04 ENCOUNTER — HEALTH MAINTENANCE LETTER (OUTPATIENT)
Age: 33
End: 2023-06-04

## 2024-05-18 NOTE — L&D DELIVERY NOTE
Henrik Fletcher is a 28 year old  who was admitted for IOL in the setting of oligohydramnios.  She received cytotec x2, expelled 2 cook catheters then received cervidil x12 hours. Following this she had SROM clear fluid and pitocin was started. With this she progressed into active labor and became complete. She began pushing with excellent descent and at 1406 delivered a baby boy in BINTA position. APGARs and weight are pending at the time of this note. Placenta delivered spontaneously and appeared intact with three vessel cord. Patient examined and noted to have right sulcal and 2nd degree perineal tear, repaired after injection of local with running 3-0 vicryl suture. Prophylactic misoprostol given. Temp immediately after delivery 102.7, will await repeat temp to make determination about antibiotics. QBL pending, .     Toshia Callaway PGY3  3/11/2018 2:47 PM     Physician Attestation  I was present for the entire delivery, including baby and placenta as well as perineal laceration repair.    Kristine Shah  Date of Service (when I saw the patient): 18      Delivery Summary    Henrik Fletcher MRN# [id number]   Age: 28 year old YOB: 1990     Labor Event Times:    Labor Onset Date       Labor Onset Time    Dilation Complete Date    Dilation Complete Time       Start Pushing Date        Start Pushing Time            Labor Length:    1st Stage (hrs/min)     2nd Stage (hrs/min)     3rd Stage (hrs/min)         Labor Events:     Labor No   Rupture Date     Rupture Time     Rupture Type Spontaneous rupture of membranes occuring during spontaneous labor or augmentation   Fluid Color     Labor Type     Induction    Induction Indication         Augmentation    Labor Complications     Additional Complications     Management of Labor        Antibiotics     IV Antibiotic Given     Additional Management     Fetal Status Prior to  Delivery     Fetal  Trulance Pending    Additonal information required from provider.  Prescription Drug Insurance: [place] Medicaid    Notes: Faxed prior authorization request to [phone] for signature. Please sign and fax to [facility] [phone], and they will complete the PA. Please update encounter when faxed to pharmacy. Thank you!     Please note - [place] Medicaid will review pa but this does not mean the pa will be approved, additional information may be needed. We do not know what additional information maybe needed until a notice has been sent to the pharmacy/MD office.    Status Comments         Cervical Ripening:    Date     Time     Type         Delivery:    Episiotomy None   Local Anesthetic        Lacerations 2nd   Sponge Count Correct       Needle Count Correct     Final Count by:    Sutures     Blood loss (ml)    Packing Intentionally Left In     Number     Comments           Information for the patient's :  Katiana Campos [[id number]]       Delivery  3/11/2018 2:06 PM by  Vaginal, Spontaneous Delivery  Sex:  male Gestational Age: 40w5d  Delivery Clinician:     Living?:            APGARS  One minute Five minutes Ten minutes   Skin color:            Heart rate:            Grimace:            Muscle tone:            Breathing:            Totals:              Presentation/position:           Resuscitation and Interventions: Method:     Oxygen Type:     Intubation Time:   # of Attempts:     ETT Size:        Tracheal Suction:     Tracheal returns:       Care at Delivery:           Cord information:     Disposition of cord blood:      Blood gases sent?    Complications:     Placenta: Delivered:           appearance.  Comments:  .  Disposition: Pathology  [place] Measurements:  Weight:    Height:    Head circumference:    Chest circumference:     Temperature:     Other providers:       Additional  information:  Forceps:    Verbal Informed Consent Obtained:       Alternative Labor Strategies Discussed:     Emergency Resources Available:       Type:       Accrued Pulling Time:       # of Pulls:      Position:     Fetal Station:       Indications:      Other Indications:     Operative Vaginal Delivery Brief Note Forceps:        Vacuum:    Verbal Informed Consent Obtained:     Alternative Labor Strategies Discussed:     Emergency Resources Available:     Type:      Accrued Pulling Time:       # of Pop-Offs:       # of Pulls:       Position:     Fetal Station:      Indications for Vacuum:       Other Indications:    Operative Vaginal Delivery Brief Note  Vacuum:        Shoulder Dystocia Shoulder Dystocia    Fetal Tracing Prior to Delivery:  Category 2   Shoulder dystocia present?:  No                                            Breech:       : Type:     Indications for Primary:     Indications for Secondary:     Other Indications:        Observed anomalies     Output in Delivery Room: